# Patient Record
Sex: FEMALE | Race: WHITE | NOT HISPANIC OR LATINO | Employment: OTHER | URBAN - METROPOLITAN AREA
[De-identification: names, ages, dates, MRNs, and addresses within clinical notes are randomized per-mention and may not be internally consistent; named-entity substitution may affect disease eponyms.]

---

## 2017-04-24 ENCOUNTER — OFFICE VISIT (OUTPATIENT)
Dept: NEUROSURGERY | Facility: CLINIC | Age: 72
End: 2017-04-24

## 2017-04-24 ENCOUNTER — HOSPITAL ENCOUNTER (OUTPATIENT)
Dept: GENERAL RADIOLOGY | Facility: HOSPITAL | Age: 72
Discharge: HOME | End: 2017-04-24
Attending: NEUROLOGICAL SURGERY

## 2017-04-24 ENCOUNTER — HOSPITAL ENCOUNTER (OUTPATIENT)
Dept: MRI IMAGING | Facility: HOSPITAL | Age: 72
Discharge: HOME | End: 2017-04-24
Attending: NEUROLOGICAL SURGERY | Admitting: NEUROLOGICAL SURGERY

## 2017-04-24 VITALS
DIASTOLIC BLOOD PRESSURE: 78 MMHG | WEIGHT: 111 LBS | TEMPERATURE: 98.2 F | HEIGHT: 62 IN | SYSTOLIC BLOOD PRESSURE: 134 MMHG | BODY MASS INDEX: 20.43 KG/M2

## 2017-04-24 DIAGNOSIS — M47.12 CERVICAL SPONDYLOSIS WITH MYELOPATHY: ICD-10-CM

## 2017-04-24 DIAGNOSIS — M48.02 SPINAL STENOSIS IN CERVICAL REGION: ICD-10-CM

## 2017-04-24 DIAGNOSIS — M54.2 NECK PAIN: ICD-10-CM

## 2017-04-24 DIAGNOSIS — D32.9 MENINGIOMA (HCC): Primary | ICD-10-CM

## 2017-04-24 PROCEDURE — 70553 MRI BRAIN STEM W/O & W/DYE: CPT

## 2017-04-24 PROCEDURE — 0 GADOBENATE DIMEGLUMINE 529 MG/ML SOLUTION: Performed by: NEUROLOGICAL SURGERY

## 2017-04-24 PROCEDURE — 99213 OFFICE O/P EST LOW 20 MIN: CPT | Performed by: NEUROLOGICAL SURGERY

## 2017-04-24 PROCEDURE — A9577 INJ MULTIHANCE: HCPCS | Performed by: NEUROLOGICAL SURGERY

## 2017-04-24 PROCEDURE — 72052 X-RAY EXAM NECK SPINE 6/>VWS: CPT

## 2017-04-24 PROCEDURE — 82565 ASSAY OF CREATININE: CPT

## 2017-04-24 RX ADMIN — GADOBENATE DIMEGLUMINE 9 ML: 529 INJECTION, SOLUTION INTRAVENOUS at 09:59

## 2017-04-24 NOTE — PROGRESS NOTES
Bianka Chan  1945  [id number]                       CURRENT WORKING DIAGNOSIS:  [Cervical myelopathy; left cerebellar meningioma ]         MEDICAL HISTORY SINCE LAST ENCOUNTER:  [This a patient who had a upper cervical decompression several years ago and now presents with what sounds like RSD in her left upper extremity.  I have been following her for an asymptomatic cerebellar meningioma is had no symptoms from that.  She reports today for a reevaluation. ]           Past Medical History:   Diagnosis Date   • Anemia    • Arthritis    • Asthma    • History of blood transfusion    • Hypertension    • Osteoporosis               Past Surgical History:   Procedure Laterality Date   • CERVICAL LAMINECTOMY DECOMPRESSION POSTERIOR  04/27/2016    C2-C3, C3-C4,C4-C5 and C5-C6            History reviewed. No pertinent family history.           Social History     Social History   • Marital status:      Spouse name: N/A   • Number of children: N/A   • Years of education: N/A     Occupational History   • Not on file.     Social History Main Topics   • Smoking status: Never Smoker   • Smokeless tobacco: Not on file   • Alcohol use No   • Drug use: No   • Sexual activity: Defer     Other Topics Concern   • Not on file     Social History Narrative              Allergies   Allergen Reactions   • Inderal [Propranolol]    • Penicillins    • Phenergan [Promethazine]               Current Outpatient Prescriptions:   •  albuterol (PROVENTIL HFA;VENTOLIN HFA) 108 (90 BASE) MCG/ACT inhaler, Inhale 2 puffs as needed for wheezing., Disp: , Rfl:   •  calcium carbonate (OS-SUSAN) 600 MG tablet, Take 600 mg by mouth daily., Disp: , Rfl:   •  Cyanocobalamin 1000 MCG/ML kit, Inject  as directed., Disp: , Rfl:   •  Denosumab (PROLIA SC), Inject  under the skin every 6 (six) months., Disp: , Rfl:   •  DICLOFENAC SODIUM PO, Take 75 mg by mouth daily., Disp: , Rfl:   •  enalapril (VASOTEC) 5 MG tablet, Take 5 mg by mouth daily., Disp:  , Rfl:   •  escitalopram (LEXAPRO) 20 MG tablet, Take 20 mg by mouth daily., Disp: , Rfl:   •  fluticasone (FLONASE) 50 MCG/ACT nasal spray, 2 sprays into each nostril as needed for rhinitis. Administer 2 sprays in each nostril for each dose., Disp: , Rfl:   •  HYDROcodone-acetaminophen (NORCO)  MG per tablet, Take 1 tablet by mouth every 6 (six) hours as needed for moderate pain (4-6) (For pain)., Disp: 120 tablet, Rfl: 0  •  orphenadrine (NORFLEX) 100 MG 12 hr tablet, Take 100 mg by mouth 2 (Two) Times a Day., Disp: , Rfl:   •  Prenatal Vit-Fe Fumarate-FA (PRENATAL, CLASSIC, VITAMIN) 28-0.8 MG tablet tablet, Take 1 tablet by mouth daily., Disp: , Rfl:   •  solifenacin (VESICARE) 10 MG tablet, Take 10 mg by mouth daily., Disp: , Rfl:   No current facility-administered medications for this visit.          Review of Systems   Constitutional: Negative for activity change, appetite change, chills, diaphoresis, fatigue, fever and unexpected weight change.   HENT: Negative for congestion, dental problem, drooling, ear discharge, ear pain, facial swelling, hearing loss, mouth sores, nosebleeds, postnasal drip, rhinorrhea, sinus pressure, sneezing, sore throat, tinnitus, trouble swallowing and voice change.    Eyes: Negative for photophobia, pain, discharge, redness, itching and visual disturbance.   Respiratory: Negative for apnea, cough, choking, chest tightness, shortness of breath, wheezing and stridor.    Cardiovascular: Negative for chest pain, palpitations and leg swelling.   Gastrointestinal: Negative for abdominal distention, abdominal pain, anal bleeding, blood in stool, constipation, diarrhea, nausea, rectal pain and vomiting.   Endocrine: Negative for cold intolerance, heat intolerance, polydipsia, polyphagia and polyuria.   Genitourinary: Negative for decreased urine volume, difficulty urinating, dysuria, enuresis, flank pain, frequency, genital sores, hematuria and urgency.   Musculoskeletal: Negative  "for arthralgias, back pain, gait problem, joint swelling, myalgias, neck pain and neck stiffness.   Skin: Negative for color change, pallor, rash and wound.   Allergic/Immunologic: Negative for environmental allergies, food allergies and immunocompromised state.   Neurological: Negative for dizziness, tremors, seizures, syncope, facial asymmetry, speech difficulty, weakness, light-headedness, numbness and headaches.   Hematological: Negative for adenopathy. Does not bruise/bleed easily.   Psychiatric/Behavioral: Negative for agitation, behavioral problems, confusion, decreased concentration, dysphoric mood, hallucinations, self-injury, sleep disturbance and suicidal ideas. The patient is not nervous/anxious and is not hyperactive.    All other systems reviewed and are negative.              Vitals:    04/24/17 1237   BP: 134/78   Temp: 98.2 °F (36.8 °C)   Weight: 111 lb (50.3 kg)   Height: 62.25\" (158.1 cm)               EXAMINATION: Cerebellar testing is normal.  There is no ataxia or dysmetria.  She does have a myelopathy and decreased range of motion in both upper extremities and lower extremities.  She also has cervical fusion.  There is no papilledema however.  No            MEDICAL DECISION MAKING: The MRI of the brain is essentially unchanged from that done to and a half years ago.  She has a small asymptomatic meningioma in the left hemisphere measuring less than 2 cm.           ASSESSMENT/DISPOSITION: She will return to see me in 1 year for follow-up of the MRI of the brain.  She is to see Dr. Dey for follow-up in a few weeks.  I've asked her to see Dr. dumont with a question regarding whether or not a stellate ganglion block would be of some benefit to her in reference to RSD of her left upper extremity.              I APPRECIATE THE OPPORTUNITY OF THIS REFERRAL. PLEASE CALL IF ANY  QUESTIONS [phone]    Scribed for Edis Romeo MD by Karen Johns CMA. 4/24/2017  1:21 PM     I have read and " concur with the information provided by the scribe.  Edis Romeo MD

## 2017-04-26 LAB — CREAT BLDA-MCNC: 0.8 MG/DL (ref 0.6–1.3)

## 2017-04-27 ENCOUNTER — TELEPHONE (OUTPATIENT)
Dept: PAIN MEDICINE | Facility: CLINIC | Age: 72
End: 2017-04-27

## 2017-04-27 RX ORDER — RANITIDINE 150 MG/1
TABLET ORAL
COMMUNITY
Start: 2017-02-22 | End: 2018-08-30

## 2017-04-27 RX ORDER — ATORVASTATIN CALCIUM 20 MG/1
TABLET, FILM COATED ORAL
COMMUNITY
Start: 2017-04-23

## 2017-05-02 ENCOUNTER — OFFICE VISIT (OUTPATIENT)
Dept: PAIN MEDICINE | Facility: CLINIC | Age: 72
End: 2017-05-02

## 2017-05-02 VITALS
RESPIRATION RATE: 16 BRPM | WEIGHT: 110 LBS | HEART RATE: 59 BPM | HEIGHT: 62 IN | BODY MASS INDEX: 20.24 KG/M2 | TEMPERATURE: 96.8 F | OXYGEN SATURATION: 98 % | DIASTOLIC BLOOD PRESSURE: 73 MMHG | SYSTOLIC BLOOD PRESSURE: 151 MMHG

## 2017-05-02 DIAGNOSIS — G89.0 CENTRAL PAIN SYNDROME: ICD-10-CM

## 2017-05-02 DIAGNOSIS — Z98.1 HISTORY OF FUSION OF CERVICAL SPINE: ICD-10-CM

## 2017-05-02 DIAGNOSIS — R53.81 PHYSICAL DECONDITIONING: ICD-10-CM

## 2017-05-02 DIAGNOSIS — G95.9 CERVICAL MYELOPATHY (HCC): ICD-10-CM

## 2017-05-02 DIAGNOSIS — Z91.81 AT HIGH RISK FOR FALLS: ICD-10-CM

## 2017-05-02 DIAGNOSIS — D32.9 MENINGIOMA (HCC): ICD-10-CM

## 2017-05-02 DIAGNOSIS — G90.512 COMPLEX REGIONAL PAIN SYNDROME TYPE 1 OF LEFT UPPER EXTREMITY: ICD-10-CM

## 2017-05-02 DIAGNOSIS — R26.9 GAIT DISTURBANCE: ICD-10-CM

## 2017-05-02 PROCEDURE — 99204 OFFICE O/P NEW MOD 45 MIN: CPT | Performed by: ANESTHESIOLOGY

## 2017-05-08 ENCOUNTER — OUTSIDE FACILITY SERVICE (OUTPATIENT)
Dept: PAIN MEDICINE | Facility: CLINIC | Age: 72
End: 2017-05-08

## 2017-05-08 PROCEDURE — 77003 FLUOROGUIDE FOR SPINE INJECT: CPT | Performed by: ANESTHESIOLOGY

## 2017-05-08 PROCEDURE — 64510 N BLOCK STELLATE GANGLION: CPT | Performed by: ANESTHESIOLOGY

## 2017-05-08 PROCEDURE — 99152 MOD SED SAME PHYS/QHP 5/>YRS: CPT | Performed by: ANESTHESIOLOGY

## 2017-05-15 ENCOUNTER — OFFICE VISIT (OUTPATIENT)
Dept: NEUROSURGERY | Facility: CLINIC | Age: 72
End: 2017-05-15

## 2017-05-15 VITALS
HEIGHT: 62 IN | BODY MASS INDEX: 20.43 KG/M2 | DIASTOLIC BLOOD PRESSURE: 82 MMHG | TEMPERATURE: 98.5 F | WEIGHT: 111 LBS | OXYGEN SATURATION: 96 % | SYSTOLIC BLOOD PRESSURE: 120 MMHG | HEART RATE: 61 BPM

## 2017-05-15 DIAGNOSIS — M48.02 SPINAL STENOSIS IN CERVICAL REGION: ICD-10-CM

## 2017-05-15 DIAGNOSIS — M47.12 CERVICAL SPONDYLOSIS WITH MYELOPATHY: ICD-10-CM

## 2017-05-15 DIAGNOSIS — D32.9 MENINGIOMA (HCC): Primary | ICD-10-CM

## 2017-05-15 DIAGNOSIS — M54.2 NECK PAIN: ICD-10-CM

## 2017-05-15 PROCEDURE — 99213 OFFICE O/P EST LOW 20 MIN: CPT | Performed by: NEUROLOGICAL SURGERY

## 2017-05-15 RX ORDER — ACETAMINOPHEN AND CODEINE PHOSPHATE 300; 30 MG/1; MG/1
1 TABLET ORAL AS NEEDED
COMMUNITY
End: 2019-12-30

## 2017-05-17 ENCOUNTER — OUTSIDE FACILITY SERVICE (OUTPATIENT)
Dept: PAIN MEDICINE | Facility: CLINIC | Age: 72
End: 2017-05-17

## 2017-05-17 PROCEDURE — 77003 FLUOROGUIDE FOR SPINE INJECT: CPT | Performed by: ANESTHESIOLOGY

## 2017-05-17 PROCEDURE — 99152 MOD SED SAME PHYS/QHP 5/>YRS: CPT | Performed by: ANESTHESIOLOGY

## 2017-05-17 PROCEDURE — 64510 N BLOCK STELLATE GANGLION: CPT | Performed by: ANESTHESIOLOGY

## 2017-06-07 ENCOUNTER — OUTSIDE FACILITY SERVICE (OUTPATIENT)
Dept: PAIN MEDICINE | Facility: CLINIC | Age: 72
End: 2017-06-07

## 2017-06-07 PROCEDURE — 64510 N BLOCK STELLATE GANGLION: CPT | Performed by: ANESTHESIOLOGY

## 2017-06-07 PROCEDURE — 99152 MOD SED SAME PHYS/QHP 5/>YRS: CPT | Performed by: ANESTHESIOLOGY

## 2017-06-07 PROCEDURE — 77003 FLUOROGUIDE FOR SPINE INJECT: CPT | Performed by: ANESTHESIOLOGY

## 2017-06-19 ENCOUNTER — OUTSIDE FACILITY SERVICE (OUTPATIENT)
Dept: PAIN MEDICINE | Facility: CLINIC | Age: 72
End: 2017-06-19

## 2017-06-19 PROCEDURE — 99152 MOD SED SAME PHYS/QHP 5/>YRS: CPT | Performed by: ANESTHESIOLOGY

## 2017-06-19 PROCEDURE — 64510 N BLOCK STELLATE GANGLION: CPT | Performed by: ANESTHESIOLOGY

## 2017-06-19 PROCEDURE — 77003 FLUOROGUIDE FOR SPINE INJECT: CPT | Performed by: ANESTHESIOLOGY

## 2017-07-12 ENCOUNTER — OUTSIDE FACILITY SERVICE (OUTPATIENT)
Dept: PAIN MEDICINE | Facility: CLINIC | Age: 72
End: 2017-07-12

## 2017-07-12 PROCEDURE — 77003 FLUOROGUIDE FOR SPINE INJECT: CPT | Performed by: ANESTHESIOLOGY

## 2017-07-12 PROCEDURE — 99152 MOD SED SAME PHYS/QHP 5/>YRS: CPT | Performed by: ANESTHESIOLOGY

## 2017-07-12 PROCEDURE — 64510 N BLOCK STELLATE GANGLION: CPT | Performed by: ANESTHESIOLOGY

## 2017-07-13 ENCOUNTER — TELEPHONE (OUTPATIENT)
Dept: PAIN MEDICINE | Facility: CLINIC | Age: 72
End: 2017-07-13

## 2017-07-13 NOTE — TELEPHONE ENCOUNTER
SPOKE WITH PT TO F/U ON GANGLION BLOCKS, SHE STATED THAT SHE WAS DOING OK AND DID USE ICE. INFORMED PT TO CALL IF ANY QUESTIONS OR CONCERNS.

## 2018-04-24 ENCOUNTER — TELEPHONE (OUTPATIENT)
Dept: NEUROSURGERY | Facility: CLINIC | Age: 73
End: 2018-04-24

## 2018-04-24 DIAGNOSIS — D49.6 BRAIN TUMOR (HCC): Primary | ICD-10-CM

## 2018-05-24 ENCOUNTER — OFFICE VISIT (OUTPATIENT)
Dept: NEUROSURGERY | Facility: CLINIC | Age: 73
End: 2018-05-24

## 2018-05-24 ENCOUNTER — HOSPITAL ENCOUNTER (OUTPATIENT)
Dept: MRI IMAGING | Facility: HOSPITAL | Age: 73
Discharge: HOME | End: 2018-05-24
Attending: NEUROLOGICAL SURGERY | Admitting: NEUROLOGICAL SURGERY

## 2018-05-24 ENCOUNTER — TELEPHONE (OUTPATIENT)
Dept: NEUROSURGERY | Facility: CLINIC | Age: 73
End: 2018-05-24

## 2018-05-24 VITALS
SYSTOLIC BLOOD PRESSURE: 128 MMHG | DIASTOLIC BLOOD PRESSURE: 60 MMHG | HEIGHT: 62 IN | TEMPERATURE: 98.3 F | BODY MASS INDEX: 23 KG/M2 | WEIGHT: 125 LBS

## 2018-05-24 DIAGNOSIS — M79.601 PAIN IN BOTH UPPER EXTREMITIES: ICD-10-CM

## 2018-05-24 DIAGNOSIS — M79.602 PAIN IN BOTH UPPER EXTREMITIES: ICD-10-CM

## 2018-05-24 DIAGNOSIS — D32.9 MENINGIOMA (HCC): Primary | ICD-10-CM

## 2018-05-24 DIAGNOSIS — D49.6 BRAIN TUMOR (HCC): ICD-10-CM

## 2018-05-24 PROCEDURE — 82565 ASSAY OF CREATININE: CPT

## 2018-05-24 PROCEDURE — 70553 MRI BRAIN STEM W/O & W/DYE: CPT

## 2018-05-24 PROCEDURE — 99213 OFFICE O/P EST LOW 20 MIN: CPT | Performed by: NEUROLOGICAL SURGERY

## 2018-05-24 PROCEDURE — 0 GADOBENATE DIMEGLUMINE 529 MG/ML SOLUTION: Performed by: NEUROLOGICAL SURGERY

## 2018-05-24 PROCEDURE — A9577 INJ MULTIHANCE: HCPCS | Performed by: NEUROLOGICAL SURGERY

## 2018-05-24 RX ORDER — CARBAMAZEPINE 100 MG/1
100 TABLET, EXTENDED RELEASE ORAL
Qty: 15 TABLET | Refills: 0 | Status: SHIPPED | OUTPATIENT
Start: 2018-05-24 | End: 2018-08-30

## 2018-05-24 RX ORDER — AMLODIPINE BESYLATE 5 MG/1
5 TABLET ORAL DAILY
COMMUNITY
End: 2018-08-30

## 2018-05-24 RX ADMIN — GADOBENATE DIMEGLUMINE 10 ML: 529 INJECTION, SOLUTION INTRAVENOUS at 12:15

## 2018-05-24 NOTE — TELEPHONE ENCOUNTER
PT STATES THAT DR ATKINS TOLD HER SHE WOULD NEED MRI BEFORE SHE SEE'S DR REED FOR ARM/SHOULDER PAIN. NO ORDERS WERE PLACED AND I DIDN'T SEE IT IN THE OFFICE NOTES.  CAN YOU PLEASE CLARIFY.

## 2018-05-24 NOTE — PROGRESS NOTES
Bianka Chan  1945  [id number]                       CURRENT WORKING DIAGNOSIS:  [Asymptomatic right cerebellar meningioma; David reflex sympathetic dystrophy left upper extremity ]         MEDICAL HISTORY SINCE LAST ENCOUNTER:  This 73-year-old female reports today for follow-up and sequential MRI investigation of an asymptomatic meningioma in the right posterior fossa.  She remains asymptomatic in reference to cerebellar dysfunction.  However she is having increasing pain in her left arm consistent with the clinical diagnosis of RSD.  Stellate ganglion blocks have been unsuccessful.           Past Medical History:   Diagnosis Date   • Anemia    • Arthritis    • Asthma    • Extremity pain    • History of blood transfusion    • Hypertension    • Osteoarthritis    • Osteoporosis               Past Surgical History:   Procedure Laterality Date   • CERVICAL LAMINECTOMY DECOMPRESSION POSTERIOR  04/27/2016    C2-C3, C3-C4,C4-C5 and C5-C6   • HYSTERECTOMY     • TONSILLECTOMY                Family History   Problem Relation Age of Onset   • No Known Problems Mother    • No Known Problems Father               Social History     Social History   • Marital status:      Spouse name: N/A   • Number of children: N/A   • Years of education: N/A     Occupational History   • Not on file.     Social History Main Topics   • Smoking status: Never Smoker   • Smokeless tobacco: Never Used   • Alcohol use No   • Drug use: No   • Sexual activity: Defer     Other Topics Concern   • Not on file     Social History Narrative   • No narrative on file              Allergies   Allergen Reactions   • Inderal [Propranolol]    • Penicillins    • Phenergan [Promethazine]               Current Outpatient Prescriptions:   •  acetaminophen-codeine (TYLENOL #3) 300-30 MG per tablet, Take 1 tablet by mouth As Needed for Moderate Pain (4-6)., Disp: , Rfl:   •  albuterol (PROVENTIL HFA;VENTOLIN HFA) 108 (90 BASE) MCG/ACT inhaler, Inhale 2  puffs as needed for wheezing., Disp: , Rfl:   •  amLODIPine (NORVASC) 5 MG tablet, Take 5 mg by mouth Daily., Disp: , Rfl:   •  atorvastatin (LIPITOR) 20 MG tablet, , Disp: , Rfl:   •  calcium carbonate (OS-SUSAN) 600 MG tablet, Take 600 mg by mouth daily., Disp: , Rfl:   •  Cyanocobalamin 1000 MCG/ML kit, Inject  as directed., Disp: , Rfl:   •  Denosumab (PROLIA SC), Inject  under the skin every 6 (six) months., Disp: , Rfl:   •  DICLOFENAC SODIUM PO, Take 75 mg by mouth daily., Disp: , Rfl:   •  enalapril (VASOTEC) 5 MG tablet, Take 5 mg by mouth daily., Disp: , Rfl:   •  escitalopram (LEXAPRO) 20 MG tablet, Take 20 mg by mouth daily., Disp: , Rfl:   •  fluticasone (FLONASE) 50 MCG/ACT nasal spray, 2 sprays into each nostril as needed for rhinitis. Administer 2 sprays in each nostril for each dose., Disp: , Rfl:   •  orphenadrine (NORFLEX) 100 MG 12 hr tablet, Take 100 mg by mouth 2 (Two) Times a Day., Disp: , Rfl:   •  Prenatal Vit-Fe Fumarate-FA (PRENATAL, CLASSIC, VITAMIN) 28-0.8 MG tablet tablet, Take 1 tablet by mouth daily., Disp: , Rfl:   •  raNITIdine (ZANTAC) 150 MG tablet, , Disp: , Rfl:   •  solifenacin (VESICARE) 10 MG tablet, Take 10 mg by mouth daily., Disp: , Rfl:   No current facility-administered medications for this visit.          Review of Systems   Constitutional: Negative for activity change, appetite change, chills, diaphoresis, fatigue, fever and unexpected weight change.   HENT: Positive for hearing loss. Negative for congestion, dental problem, drooling, ear discharge, ear pain, facial swelling, mouth sores, nosebleeds, postnasal drip, rhinorrhea, sinus pressure, sneezing, sore throat, tinnitus, trouble swallowing and voice change.    Eyes: Negative for photophobia, pain, discharge, redness, itching and visual disturbance.   Respiratory: Negative for apnea, cough, choking, chest tightness, shortness of breath, wheezing and stridor.    Cardiovascular: Negative for chest pain, palpitations  "and leg swelling.   Gastrointestinal: Negative for abdominal distention, abdominal pain, anal bleeding, blood in stool, constipation, diarrhea, nausea, rectal pain and vomiting.   Endocrine: Negative for cold intolerance, heat intolerance, polydipsia, polyphagia and polyuria.   Genitourinary: Positive for frequency and urgency. Negative for decreased urine volume, difficulty urinating, dysuria, enuresis, flank pain, genital sores and hematuria.   Musculoskeletal: Positive for arthralgias and gait problem. Negative for back pain, joint swelling, myalgias, neck pain and neck stiffness.   Skin: Negative for color change, pallor, rash and wound.   Allergic/Immunologic: Negative for environmental allergies, food allergies and immunocompromised state.   Neurological: Positive for numbness. Negative for dizziness, tremors, seizures, syncope, facial asymmetry, speech difficulty, weakness, light-headedness and headaches.   Hematological: Negative for adenopathy. Does not bruise/bleed easily.   Psychiatric/Behavioral: Positive for dysphoric mood. Negative for agitation, behavioral problems, confusion, decreased concentration, hallucinations, self-injury, sleep disturbance and suicidal ideas. The patient is nervous/anxious. The patient is not hyperactive.                Vitals:    05/24/18 1353   BP: 128/60   BP Location: Right arm   Patient Position: Sitting   Temp: 98.3 °F (36.8 °C)   TempSrc: Temporal Artery    Weight: 56.7 kg (125 lb)   Height: 158 cm (62.21\")               EXAMINATION: She has diminished range of motion of her neck secondary to her previous fusion.  Examination otherwise is unchanged.  No ataxia or dysfunction of gait different from her previous examinations.            MEDICAL DECISION MAKING: The data set of MRI I from the brain indicates that the tumor is slightly bigger by approximately 2-3 millimeters.  There is no secondary change on the T2 images in the cerebellum therefore it remains quiet sent " although it has enlarged.  There is no shift.           ASSESSMENT/DISPOSITION: I've suggested repeating her MRI in 6 months.  If it shows progression I would recommend SRS.  In the interim I've given her prescription of Tegretol 100 mg to see if this will be of any benefit to the RSD.  She will call me in 2 weeks.              I APPRECIATE THE OPPORTUNITY OF THIS REFERRAL. PLEASE CALL IF ANY       QUESTIONS [phone]    Scribed for Edis Romeo MD by Janey Cerrato CMA. 5/24/2018  2:12 PM     I have read and concur with the information provided by the scribe.  Edis Romeo MD

## 2018-05-29 LAB — CREAT BLDA-MCNC: 0.8 MG/DL (ref 0.6–1.3)

## 2018-06-08 ENCOUNTER — TELEPHONE (OUTPATIENT)
Dept: NEUROSURGERY | Facility: CLINIC | Age: 73
End: 2018-06-08

## 2018-06-08 NOTE — TELEPHONE ENCOUNTER
----- Message from Makenzie Walsh sent at 6/8/2018 12:27 PM EDT -----  Contact: [phone]  PATIENT CALLING TO SAY THAT THE TEGRETOL 100 ONE AT NIGHT IS NOT HELPING HER.

## 2018-06-12 RX ORDER — PREGABALIN 75 MG/1
75 CAPSULE ORAL 2 TIMES DAILY
Qty: 28 CAPSULE | Refills: 0 | OUTPATIENT
Start: 2018-06-12 | End: 2018-07-13

## 2018-06-12 NOTE — TELEPHONE ENCOUNTER
Called pt to inform her of lyrica rx and free trial card.  Both called into the [facility] pharmacy.  Pt will call back in a week with an update on the effectiveness of this medication and if she needs a refill.  Mail copay savings card to pt for refills.

## 2018-06-25 ENCOUNTER — TELEPHONE (OUTPATIENT)
Dept: NEUROSURGERY | Facility: CLINIC | Age: 73
End: 2018-06-25

## 2018-06-25 NOTE — TELEPHONE ENCOUNTER
Provider: Ousmane  Caller: pt   Time of call: 4:30    Phone #: [phone]    Surgery: N/A  Last visit: 05/24/18     Next visit: 07/13/18 ([place])       Reason for call: Dr. Romeo asked the pt to call with an update about the Lyrica. The pt states the medicine helped with the pain. However, it made her so dizzy she couldn't stand so she has stopped taking this medication.

## 2018-06-25 NOTE — TELEPHONE ENCOUNTER
Bianka was taking 75 mg of Lyrica which helped considerably however the side effects of dizziness major stop.  I would like to try her own 25 mg at night only.    Recall this and an check with her in the morning.  The me know what's up.  Thank you

## 2018-06-26 NOTE — TELEPHONE ENCOUNTER
Talked with Dr. Romeo - He said 75mg was too high for pt and to decrease to 25mg qhs.  #15. Called rx into [facility] pharmacy.      Called pt (home #) to inform of new rx at pharmacy.  No answer.   Called mobile  - spoke with pt.  She verbalized understanding and will call with an update in about 1 week or sooner if needed.      If dosage is adequate, a refill will need to be sent in at that time.

## 2018-07-13 ENCOUNTER — OFFICE VISIT (OUTPATIENT)
Dept: NEUROSURGERY | Facility: CLINIC | Age: 73
End: 2018-07-13

## 2018-07-13 VITALS
TEMPERATURE: 98.6 F | BODY MASS INDEX: 22.38 KG/M2 | OXYGEN SATURATION: 98 % | WEIGHT: 121.6 LBS | SYSTOLIC BLOOD PRESSURE: 126 MMHG | HEIGHT: 62 IN | HEART RATE: 54 BPM | DIASTOLIC BLOOD PRESSURE: 70 MMHG

## 2018-07-13 DIAGNOSIS — M48.02 SPINAL STENOSIS IN CERVICAL REGION: ICD-10-CM

## 2018-07-13 DIAGNOSIS — M79.602 PAIN IN BOTH UPPER EXTREMITIES: ICD-10-CM

## 2018-07-13 DIAGNOSIS — M79.601 PAIN IN BOTH UPPER EXTREMITIES: ICD-10-CM

## 2018-07-13 DIAGNOSIS — M47.12 CERVICAL SPONDYLOSIS WITH MYELOPATHY: ICD-10-CM

## 2018-07-13 DIAGNOSIS — G90.512 COMPLEX REGIONAL PAIN SYNDROME TYPE 1 OF LEFT UPPER EXTREMITY: ICD-10-CM

## 2018-07-13 DIAGNOSIS — D32.9 MENINGIOMA (HCC): Primary | ICD-10-CM

## 2018-07-13 DIAGNOSIS — M54.2 NECK PAIN: ICD-10-CM

## 2018-07-13 DIAGNOSIS — D49.6 BRAIN TUMOR (HCC): ICD-10-CM

## 2018-07-13 PROCEDURE — 99214 OFFICE O/P EST MOD 30 MIN: CPT | Performed by: NEUROLOGICAL SURGERY

## 2018-07-13 NOTE — PROGRESS NOTES
Patient: Bianka Chan  :  1945  Chart #:  [id number]    Date of Service: 18    Chief Complaint:   Chief Complaint   Patient presents with   • Neck Pain       Neck Pain    Chronicity: Mrs. Chan returns today for a follow-up on her cervical pain. Episode onset: He put her on Lyrica however it was not effective. The problem occurs constantly (Patient states that she has been miserable for 2 years now.). The problem has been unchanged (Dr. Romeo told her that her brain tumor was growing.). The pain is associated with an unknown factor. The pain is present in the right side (She complains of pain in her LUE.  She is no longer having any problems with her cervical spine.). The quality of the pain is described as aching (She has no problems with her RUE.). The pain is at a severity of 5/10. The pain is moderate. Exacerbated by: Her strenth is  The pain is same all the time. Associated symptoms include headaches, numbness and weakness. She has tried bed rest, heat and NSAIDs (Patient has been to physical therapy, however her relief was short lived.) for the symptoms. The treatment provided mild relief.       Radiographic Images:   Brain MRI done 18 was reviewed showing an extra-axial tumor in L posterior fossa c/w meningioma.    No recent studies of neck.    Past Medical History:   Diagnosis Date   • Anemia    • Arthritis    • Asthma    • Extremity pain    • History of blood transfusion    • Hypertension    • Osteoarthritis    • Osteoporosis      Current Outpatient Prescriptions   Medication Sig Dispense Refill   • acetaminophen-codeine (TYLENOL #3) 300-30 MG per tablet Take 1 tablet by mouth As Needed for Moderate Pain (4-6).     • albuterol (PROVENTIL HFA;VENTOLIN HFA) 108 (90 BASE) MCG/ACT inhaler Inhale 2 puffs as needed for wheezing.     • amLODIPine (NORVASC) 5 MG tablet Take 5 mg by mouth Daily.     • atorvastatin (LIPITOR) 20 MG tablet      • calcium carbonate (OS-SUSAN) 600 MG tablet Take  600 mg by mouth daily.     • carBAMazepine XR (TEGRETOL-XR) 100 MG 12 hr tablet Take 1 tablet by mouth every night at bedtime. 15 tablet 0   • Cyanocobalamin 1000 MCG/ML kit Inject  as directed.     • Denosumab (PROLIA SC) Inject  under the skin every 6 (six) months.     • DICLOFENAC SODIUM PO Take 75 mg by mouth daily.     • enalapril (VASOTEC) 5 MG tablet Take 5 mg by mouth daily.     • escitalopram (LEXAPRO) 20 MG tablet Take 20 mg by mouth daily.     • fluticasone (FLONASE) 50 MCG/ACT nasal spray 2 sprays into each nostril as needed for rhinitis. Administer 2 sprays in each nostril for each dose.     • Prenatal Vit-Fe Fumarate-FA (PRENATAL, CLASSIC, VITAMIN) 28-0.8 MG tablet tablet Take 1 tablet by mouth daily.     • raNITIdine (ZANTAC) 150 MG tablet      • solifenacin (VESICARE) 10 MG tablet Take 10 mg by mouth daily.       No current facility-administered medications for this visit.       Allergies   Allergen Reactions   • Inderal [Propranolol]    • Penicillins    • Phenergan [Promethazine]      Social History     Social History   • Marital status:      Social History Main Topics   • Smoking status: Never Smoker   • Smokeless tobacco: Never Used   • Alcohol use No   • Drug use: No   • Sexual activity: Defer     Other Topics Concern   • Not on file     Family History   Problem Relation Age of Onset   • No Known Problems Mother    • No Known Problems Father      Past Surgical History:   Procedure Laterality Date   • CERVICAL LAMINECTOMY DECOMPRESSION POSTERIOR  04/27/2016    C2-C3, C3-C4,C4-C5 and C5-C6   • HYSTERECTOMY     • TONSILLECTOMY       Review of Systems   Constitutional: Positive for fatigue.   Eyes: Positive for visual disturbance.   Respiratory: Positive for shortness of breath.    Endocrine: Positive for polyuria.   Genitourinary: Positive for frequency and urgency.   Musculoskeletal: Positive for gait problem and neck pain.   Neurological: Positive for weakness, numbness and headaches.  "  Psychiatric/Behavioral: Positive for dysphoric mood.   All other systems reviewed and are negative.    Vitals:    07/13/18 1041   BP: 126/70   BP Location: Right arm   Patient Position: Sitting   Pulse: 54   Temp: 98.6 °F (37 °C)   TempSrc: Temporal Artery    SpO2: 98%   Weight: 55.2 kg (121 lb 9.6 oz)   Height: 158 cm (62.21\")     Physical Exam  Neurologic Exam  Physical Exam   Constitutional: The patient is oriented to person, place, and time.  The patient appears well-developed and well-nourished and in no distress.   Neat elderly female  HENT:   Head: Normocephalic.   Right Ear: Hearing normal.   Left Ear: Hearing normal.   Mouth/Throat: Uvula is midline, oropharynx is clear and moist and mucous membranes are normal.   Eyes: Conjunctivae, EOM and lids are normal. Pupils are equal, round, and reactive to light.   Fundoscopic exam:  The right eye shows no papilledema.   The left eye shows no papilledema.   Pupils 2 mm; Fundi normal   Neck: Trachea normal and normal range of motion. No thyroid mass present.  Healed anterior and posterior incisions.    Cardiovascular: Regular rhythm   Pulses:  Carotid pulses are 2+ on the right side, and 2+ on the left side.  Radial pulses are 2+ on the right side, and 2+ on the left side.   Dorsalis pedis pulses are 2+ on the right side, and 2+ on the left side.   Pulmonary/Chest: Effort normal     Extremities:  No atrophy     Neurologic Exam      Mental Status   Oriented to person, place, and time.   Attention: normal. Concentration: normal.   Speech: speech is normal   Level of consciousness: alert  Knowledge: good and consistent with education.   Normal comprehension.      Cranial Nerves   Cranial nerves II through XII intact.     Motor Exam   Muscle bulk: normal  Overall muscle tone: increased in legs;  No Pronator Drift    Strength   Strength 5/5 throughout.      Sensory Exam   Light touch normal except hypesthesia both hands and feet.  No Allodynia Left arm  Proprioception " normal.      Gait, Coordination, and Reflexes      Gait: mildly spastic and very unsteady     Tremor   Resting tremor: absent  Intention tremor: absent  Action tremor: absent     Reflexes   Right biceps: 2+  Left biceps: 2+  Right triceps: 2+  Left triceps: 2+  Right patellar: 2+  Left patellar: 2+  Right achilles: 1+  Left achilles: 1+  No Babinski signs  Right Palomares: present  Left Palomares: present  Right ankle clonus: absent  Left ankle clonus: absent  LINDSEY normal; R handed      Bianka was seen today for neck pain.    Diagnoses and all orders for this visit:    Meningioma (CMS/HCC)  -     MRI Cervical Spine Without Contrast; Future  -     XR Spine Cervical Complete 4 or 5 View; Future    Cervical spondylosis with myelopathy  -     MRI Cervical Spine Without Contrast; Future  -     XR Spine Cervical Complete 4 or 5 View; Future    Pain in both upper extremities  -     MRI Cervical Spine Without Contrast; Future  -     XR Spine Cervical Complete 4 or 5 View; Future    Neck pain  -     MRI Cervical Spine Without Contrast; Future  -     XR Spine Cervical Complete 4 or 5 View; Future    Brain tumor (CMS/HCC)  -     MRI Cervical Spine Without Contrast; Future  -     XR Spine Cervical Complete 4 or 5 View; Future    Spinal stenosis in cervical region  -     MRI Cervical Spine Without Contrast; Future  -     XR Spine Cervical Complete 4 or 5 View; Future    Complex regional pain syndrome type 1 of left upper extremity    Other orders  -     Cancel: XR Spine Cervical Complete With Obli Flex Ext; Future      Plan:  Order cervical spine x-rays and MRI scan;  Return for re-evaluation after studies.      I, Dr. Stephen Dey, personally performed the services described in the documentation as scribed in my presence, and the documentation is both accurate and complete.    Stephen Dey MD

## 2018-07-25 ENCOUNTER — HOSPITAL ENCOUNTER (OUTPATIENT)
Dept: GENERAL RADIOLOGY | Facility: HOSPITAL | Age: 73
Discharge: HOME | End: 2018-07-25
Attending: NEUROLOGICAL SURGERY

## 2018-07-25 ENCOUNTER — OFFICE VISIT (OUTPATIENT)
Dept: NEUROSURGERY | Facility: CLINIC | Age: 73
End: 2018-07-25

## 2018-07-25 ENCOUNTER — HOSPITAL ENCOUNTER (OUTPATIENT)
Dept: MRI IMAGING | Facility: HOSPITAL | Age: 73
Discharge: HOME | End: 2018-07-25
Attending: NEUROLOGICAL SURGERY | Admitting: NEUROLOGICAL SURGERY

## 2018-07-25 VITALS
HEIGHT: 62 IN | WEIGHT: 121 LBS | DIASTOLIC BLOOD PRESSURE: 80 MMHG | TEMPERATURE: 96.3 F | SYSTOLIC BLOOD PRESSURE: 130 MMHG | BODY MASS INDEX: 22.26 KG/M2

## 2018-07-25 DIAGNOSIS — D32.9 MENINGIOMA (HCC): Primary | ICD-10-CM

## 2018-07-25 DIAGNOSIS — M79.601 PAIN IN BOTH UPPER EXTREMITIES: ICD-10-CM

## 2018-07-25 DIAGNOSIS — M47.12 CERVICAL SPONDYLOSIS WITH MYELOPATHY: ICD-10-CM

## 2018-07-25 DIAGNOSIS — D49.6 BRAIN TUMOR (HCC): ICD-10-CM

## 2018-07-25 DIAGNOSIS — D32.9 MENINGIOMA (HCC): ICD-10-CM

## 2018-07-25 DIAGNOSIS — M79.602 PAIN IN BOTH UPPER EXTREMITIES: ICD-10-CM

## 2018-07-25 DIAGNOSIS — G90.512 COMPLEX REGIONAL PAIN SYNDROME TYPE 1 OF LEFT UPPER EXTREMITY: ICD-10-CM

## 2018-07-25 DIAGNOSIS — M48.02 SPINAL STENOSIS IN CERVICAL REGION: ICD-10-CM

## 2018-07-25 DIAGNOSIS — M54.2 NECK PAIN: ICD-10-CM

## 2018-07-25 PROCEDURE — 72141 MRI NECK SPINE W/O DYE: CPT

## 2018-07-25 PROCEDURE — 72050 X-RAY EXAM NECK SPINE 4/5VWS: CPT

## 2018-07-25 PROCEDURE — 99213 OFFICE O/P EST LOW 20 MIN: CPT | Performed by: NEUROLOGICAL SURGERY

## 2018-07-25 NOTE — PROGRESS NOTES
Patient: Bianka Chan  :  1945  Chart #:  [id number]    Date of Service: 18    Chief Complaint:   Chief Complaint   Patient presents with   • Neck Pain       Neck Pain    Chronicity: Mrs. Chan returns today for a follow-up on her cervical pain.  The problem occurs daily. The problem has been unchanged. The pain is associated with an unknown factor. The pain is present in the occipital region (She complains of a burning and a itching sensation in the back of her neck.). The quality of the pain is described as aching. The pain is at a severity of 4/10 (She has had trouble with her LUE for 2 years now.  She states she has no feeling in her left hand.  She complains of numbness and tingling in that hand.). The pain is mild. The symptoms are aggravated by position (She has pain with range of motion.). Stiffness is present in the morning. Associated symptoms include numbness. She has tried bed rest, heat and NSAIDs for the symptoms. The treatment provided mild relief.       Radiographic Images:   MRI of the cervical spine dated 18 shows a s/p ACDF at C3C4 and C5C6.  She has a mild kyphotic deformity.  There is some spinal stenosis at C5-C6 without spinal cord compression.  She has spinal cord signal change opposite the C3-C4 disc space.    X-rays of the cervical spine dated 18 shows anterior discectomy at C3-C4 and C4-C5.  She has posterior screws in the C2, C4 and C5 levels.  She has had a occipitocervical fusion.        Past Medical History:   Diagnosis Date   • Anemia    • Arthritis    • Asthma    • Extremity pain    • History of blood transfusion    • Hypertension    • Osteoarthritis    • Osteoporosis      Current Outpatient Prescriptions   Medication Sig Dispense Refill   • acetaminophen-codeine (TYLENOL #3) 300-30 MG per tablet Take 1 tablet by mouth As Needed for Moderate Pain (4-6).     • albuterol (PROVENTIL HFA;VENTOLIN HFA) 108 (90 BASE) MCG/ACT inhaler Inhale 2 puffs as needed  for wheezing.     • amLODIPine (NORVASC) 5 MG tablet Take 5 mg by mouth Daily.     • atorvastatin (LIPITOR) 20 MG tablet      • calcium carbonate (OS-SUSAN) 600 MG tablet Take 600 mg by mouth daily.     • carBAMazepine XR (TEGRETOL-XR) 100 MG 12 hr tablet Take 1 tablet by mouth every night at bedtime. 15 tablet 0   • Cyanocobalamin 1000 MCG/ML kit Inject  as directed.     • Denosumab (PROLIA SC) Inject  under the skin every 6 (six) months.     • DICLOFENAC SODIUM PO Take 75 mg by mouth daily.     • enalapril (VASOTEC) 5 MG tablet Take 5 mg by mouth daily.     • escitalopram (LEXAPRO) 20 MG tablet Take 20 mg by mouth daily.     • fluticasone (FLONASE) 50 MCG/ACT nasal spray 2 sprays into each nostril as needed for rhinitis. Administer 2 sprays in each nostril for each dose.     • Prenatal Vit-Fe Fumarate-FA (PRENATAL, CLASSIC, VITAMIN) 28-0.8 MG tablet tablet Take 1 tablet by mouth daily.     • raNITIdine (ZANTAC) 150 MG tablet      • solifenacin (VESICARE) 10 MG tablet Take 10 mg by mouth daily.       No current facility-administered medications for this visit.       Allergies   Allergen Reactions   • Inderal [Propranolol]    • Penicillins    • Phenergan [Promethazine]      Social History     Social History   • Marital status:      Social History Main Topics   • Smoking status: Never Smoker   • Smokeless tobacco: Never Used   • Alcohol use No   • Drug use: No   • Sexual activity: Defer     Other Topics Concern   • Not on file     Family History   Problem Relation Age of Onset   • No Known Problems Mother    • No Known Problems Father      Past Surgical History:   Procedure Laterality Date   • CERVICAL LAMINECTOMY DECOMPRESSION POSTERIOR  04/27/2016    C2-C3, C3-C4,C4-C5 and C5-C6   • HYSTERECTOMY     • TONSILLECTOMY       Review of Systems   Constitutional: Positive for fatigue.   HENT: Positive for rhinorrhea.    Endocrine: Positive for polyuria.   Genitourinary: Positive for urgency.   Musculoskeletal:  "Positive for gait problem and neck pain.   Neurological: Positive for numbness.   Hematological: Bruises/bleeds easily.   Psychiatric/Behavioral: Positive for dysphoric mood.   All other systems reviewed and are negative.    Vitals:    07/25/18 1412   BP: 130/80   Temp: 96.3 °F (35.7 °C)   Weight: 54.9 kg (121 lb)   Height: 158 cm (62.21\")     Physical Exam  Neurologic Exam  Constitutional: The patient is oriented to person, place, and time.  The patient appears well-developed and well-nourished and in no distress.   Neat elderly female  HENT:   Head: Normocephalic.   Right Ear: Hearing normal.   Left Ear: Hearing normal.   Mouth/Throat: Uvula is midline, oropharynx is clear and moist and mucous membranes are normal.   Eyes: Conjunctivae, EOM and lids are normal. Pupils are equal, round, and reactive to light.   Pupils 2 mm  Neck: Trachea normal and diminished range of motion. No thyroid mass present.  Healed anterior and posterior incisions.    Cardiovascular: Regular rhythm   Pulses:  Carotid pulses are 2+ on the right side, and 2+ on the left side.  Radial pulses are 2+ on the right side, and 2+ on the left side.   Dorsalis pedis pulses are 2+ on the right side, and 2+ on the left side.   Pulmonary/Chest: Effort normal     Extremities:  No atrophy      Neurologic Exam      Mental Status   Oriented to person, place, and time.   Attention: normal. Concentration: normal.   Speech: speech is normal   Level of consciousness: alert  Knowledge: good and consistent with education.   Normal comprehension.      Cranial Nerves   Cranial nerves II through XII intact.      Motor Exam   Muscle bulk: normal  Overall muscle tone: increased in legs;  No Pronator Drift    Strength   Strength 5/5 throughout.      Sensory Exam   Light touch normal except hypesthesia both hands and feet.  No Allodynia Left arm  Proprioception normal.      Gait, Coordination, and Reflexes      Gait: mildly spastic and very unsteady     Tremor "   Resting tremor: absent  Intention tremor: absent  Action tremor: absent     Reflexes   Right biceps: 2+  Left biceps: 2+  Right triceps: 2+  Left triceps: 2+  Right patellar: 2+  Left patellar: 2+  Right achilles: 1+  Left achilles: 1+  No Babinski signs  Right Palomares: present  Left Palomares: present  Right ankle clonus: absent  Left ankle clonus: absent  LINDSEY normal; R handed      Bianka was seen today for neck pain.    Diagnoses and all orders for this visit:    Meningioma (CMS/[facility])  -     Ambulatory Referral to Pain Management    Brain tumor (CMS/[facility])  -     Ambulatory Referral to Pain Management    Cervical spondylosis with myelopathy  -     Ambulatory Referral to Pain Management    Spinal stenosis in cervical region  -     Ambulatory Referral to Pain Management    Pain in both upper extremities  -     Ambulatory Referral to Pain Management    Complex regional pain syndrome type 1 of left upper extremity  -     Ambulatory Referral to Pain Management    Neck pain  -     Ambulatory Referral to Pain Management      Plan:  I do not recommend any cervical spine surgery at this time.  I have recommended seeing Dr. Brown again.  She is to return prn if new symptoms arise.  I have discussed this with the patient.      I, Dr. Stephen Dey, personally performed the services described in the documentation as scribed in my presence, and the documentation is both accurate and complete.    Stephen Dey MD

## 2018-08-06 ENCOUNTER — TELEPHONE (OUTPATIENT)
Dept: PAIN MEDICINE | Facility: CLINIC | Age: 73
End: 2018-08-06

## 2018-08-06 NOTE — TELEPHONE ENCOUNTER
[facility] Report #[id number]  MRI Cervical Spine w/o Contrast on 07/25/2018: Sagittal images cover from the clivus through T5 caudally. There is anterior fusion hardware spanning from C3 through C5 and posterior fusion hardware spanning from C2 through C5. There is osseous fusion across the C3-6 disc spaces. The facets are obscured at the operative levels posteriorly due to susceptibility artifact. There is reversal of the usual lordosis at the C6 level. Marrow signal at the nonoperative levels is unremarkable. T5 superior endplate fracture is noted with lack of edema indicating chronicity; this is new since 2016, however. Cerebellar tonsils are low lying, unchanged. Extra-axial posterior fossa mass is unchanged, probably meningioma. Myelomalacia again noted at the C4 level of the cord. Cord is normal in caliber grossly. Degenerative changes are as follows: C2-3: No significant foraminal or spinal canal stenosis. C3-4: No significant foraminal or spinal canal stenosis. C4-5: Bilateral facet and uncovertebral arthropathy. At least moderate bilateral foraminal stenosis. Spinal canal stenosis relieved by laminectomy. C5-6: Prominent posterior disc osteophyte complex and bilateral facet and uncovertebral arthropathy. At least moderate bilateral foraminal stenosis. No significant spinal canal stenosis, apparently relieved by laminectomies. C6-7: Prominent posterior disc osteophyte complex and facet and uncovertebral arthropathy. Mild foraminal stenosis. Severe spinal canal stenosis. C7/T1: No significant foraminal or spinal canal stenosis. Incidental imaging of the cervical soft tissues is unremarkable. Incompletely visualized bilateral shoulder joint effusions.  X-Ray Cervical Spine on 07/25/2018: AP, lateral, both oblique, and odontoid views of the cervical spine reveal hardware anteriorly and posteriorly fusing the upper cervical spine. There is no evidence of hardware complication or malalignment. The odontoid is  suboptimal in its visualization due to the hardware. No gross malalignment identified. The odontoid is not well seen. There are degenerative changes identified at the C4/C5 level. There is disc space narrowing and sclerosis of the endplates of C4 and C5. Minimal degenerative changes seen within the posterior facets. Slight reversal seen of the normal lordosis of the cervical spine.      MRI Brain w/o Contrast on 05/24/2018: There is a uniformly enhancing mass in the posterior fossa to the left of midline. This has a dural attachment with uniform enhancement typical of a meningioma. The meningioma measures 16.4 x 17.9 cm. This is slightly larger than on the previous examination of 04/24/2017 when the measurements were 12.5 x 15.4 cm. This process produces minimal mass effect and there is no supratentorial abnormality.

## 2018-08-25 NOTE — PROGRESS NOTES
"Chief Complaint: \"Pain in my neck and left arm.\"        History of Present Illness: Ms. Bianka Chan, 73 y.o. female,  originally referred by Dr. Stephen Dey in consultation for intractable chronic left arm pain. Patient was last seen on 07/12/2017, for left stellate ganglion block. As per last telephone note, patient's left upper extremity pain was significantly improved after her block. Then, patient did not return for follow up with me or Dr. Dey.  She just recently underwent neurosurgical consultation with Dr Dey on 07/25/2018, and was found not to be a surgical candidate. Patient returns to the clinic with a chief complaint of left upper extremity pain. Patient reports that she did well with the blocks but later on, 2-3 months after, her pain recurred.  Pain history: Patient reports a 2 year history of pain, which began after falling several times and some physical therapy session for her neck.   Pain description: constant pain with intermittent exacerbation, described as burning sensation.   Radiation of pain: The pain radiates from the neck, to the left shoulder, and posterior aspect of the left arm all the way down to her hand.   Pain intensity today: 8/10  Average pain intensity last week: 7/10  Pain intensity ranges from: 3/10 to 10/10  Aggravating factors: Pain increases with any activities   Alleviating factors: Pain decreases with lying and analgesics.     Associated symptoms:   Patient reports pain, numbness and weakness in the left upper extremity   Patient denies any new bladder or bowel problems.   Patient reports difficulties with her balance but denies recent falls.      Review of previous therapies and additional medical records:  Bianka Chan has already failed the following measures, including:  Conservative measures: oral analgesics, opioids, physical therapy, ice and heat   Interventional measures: Patient underwent left stellate ganglion blocks on 07/12/2017, 06/19/2017, " 05/17/2017, and 05/08/2017, with significant pain relief and functional improvement of her left upper extremity pain.   Surgical measures: Patient underwent four cervical spine surgeries. Her last surgery, on 04/27/2016 decompressive laminectomies and segmental posterolateral fusion with vertex screws, rods and morselized bone graft C2-C3/C5-C6.  Bianka Chan underwent neurosurgical consultation with Dr. Stephen Dey on 07/25/2017, and more recently on 07/25/2018 and was found not to be a surgical candidate.  Bianka Chan presents with significant comorbidities including cervical myelopathy, gait disturbance, anemia, hypertension, asthma osteoarthritis, osteoporosis; engaged in treatment.  In terms of current analgesics, Bianka Chan takes: Tylenol #3, diclofenac. Patient failed several medications including gabapentin, Tegretol, Lyrica, to name a few   I have reviewed [facility] Report #[id number] consistent to medication reconciliation.     Global Pain Scale 08-30-18                  Pain  18                 Feelings  8                 Clinical outcomes  18                 Activities  22                 GPS Total:  66                    Review of New Diagnostic Studies:    MRI Cervical Spine w/o Contrast on 07/25/2018: I have reviewed the images. Sagittal images cover from the clivus through T5 caudally. Anterior fusion hardware from C3 through C5 and posterior fusion hardware spanning from C2 through C5. There is osseous fusion across the C3-C6 disc spaces. The facets are obscured at the operative levels posteriorly due to susceptibility artifact. There is reversal of the usual lordosis at the C6 level. Marrow signal at the nonoperative levels is unremarkable. T5 superior endplate fracture is noted with lack of edema indicating chronicity; this is new since 2016, however. Cerebellar tonsils are low lying unchanged. Extra-axial posterior fossa mass is unchanged, probably meningioma. Myelomalacia again  noted at the C4 level of the cord. Cord is normal in caliber grossly. Degenerative changes are as follows:   C2-C3: No significant foraminal or spinal canal stenosis.   C3-C4: No significant foraminal or spinal canal stenosis.   C4-C5: Bilateral facet and uncovertebral arthropathy. Moderate bilateral foraminal stenosis. Spinal canal stenosis relieved by laminectomy.   C5-C6: Prominent posterior disc osteophyte complex and bilateral facet and uncovertebral arthropathy. Moderate bilateral foraminal stenosis. No significant spinal canal stenosis, apparently relieved by laminectomies.   C6-C7: Prominent posterior disc osteophyte complex and facet and uncovertebral arthropathy. Mild foraminal stenosis. Severe spinal canal stenosis.  C7/T1: No significant foraminal or spinal canal stenosis.   X-Ray Cervical Spine on 07/25/2018: AP, lateral, both oblique, and odontoid views of the cervical spine reveal hardware anteriorly and posteriorly fusing the upper cervical spine. There is no evidence of hardware complication or malalignment. The odontoid is suboptimal in its visualization due to the hardware. No gross malalignment identified. The odontoid is not well seen. There are degenerative changes identified at the C4/C5 level. There is disc space narrowing and sclerosis of the endplates of C4 and C5. Minimal degenerative changes seen within the posterior facets. Slight reversal seen of the normal lordosis of the cervical spine.      MRI Brain w/o Contrast on 05/24/2018: uniformly enhancing mass in the posterior fossa to the left of midline. This has a dural attachment with uniform enhancement typical of a meningioma. The meningioma measures 16.4 x 17.9 cm. This is slightly larger than on the previous examination of 04/24/2017 when the measurements were 12.5 x 15.4 cm. This process produces minimal mass effect and there is no supratentorial abnormality.     Review of Previous Diagnostic Studies:   X-Ray Cervical Spine Complete  w/ Flex/Ext on 04/24/2017: Anterior and posterior fusion hardware are present at C2, C3, C4, and C5. The anterior fusion hardware and cerclage wires are intact. New posterior fusion hardware is present. There are no areas of osteolysis to suggest loosening. The prevertebral soft tissues are normal. There is mild reversal of the normal cervical lordosis in the lower cervical region. The cervicothoracic junction is normal. A normal relationship between C1 and C2 is present. There is limited range of motion with flexion and extension.   MRI Brain w/ & w/o Contrast on 04/24/2017: There are no areas of restricted diffusion. The midline structures are central. There is no midline shift. Metallic artifact is present in the left suboccipital region. T2 sequences reveal normal parenchymal signal intensity. The globes and orbits are normal. The paranasal sinuses and mastoid air cells are well pneumatized. Normal flow voids are present in the distal internal carotid arteries and basilar artery. The superior sagittal sinus is widely patent. The corpus callosum, pituitary gland, and foramen magnum are normal. Hardware is present in the cervical region. After contrast, and extra-axial lesion is present in the left posterior fossa measuring approximately 1.5 x 1.2 cm in cross-sectional area; unchanged since 02/22/2016. There are no other abnormal areas of enhancement.  X-Ray Cervical Spine on 04/27/2016- In addition to the previous anterior fusion from C3 through C5, a new posterior fusion has been performed from C2 through C5. Hardware is well positioned and the alignment is appropriate in the lateral view.      Review of Systems   Gastrointestinal: Positive for constipation and diarrhea.   Endocrine: Positive for polyuria.   Genitourinary: Positive for frequency and urgency.   Musculoskeletal:        Left arm pain   Neurological: Positive for numbness.   Hematological: Bruises/bleeds easily.   Psychiatric/Behavioral: The patient  is nervous/anxious.         Depression   All other systems reviewed and are negative.        Patient Active Problem List   Diagnosis   • Pain in both upper extremities   • Meningioma (CMS/HCC)   • Spinal stenosis in cervical region   • Cervical spondylosis with myelopathy   • Complex regional pain syndrome type 1 of left upper extremity   • History of fusion of cervical spine   • Cervical myelopathy (CMS/HCC)   • Physical deconditioning   • Central pain syndrome   • Gait disturbance   • At high risk for falls   • Bilateral occipital neuralgia       Past Medical History:   Diagnosis Date   • Anemia    • Arthritis    • Asthma    • Extremity pain    • History of blood transfusion    • Hypertension    • Osteoarthritis    • Osteoporosis          Past Surgical History:   Procedure Laterality Date   • CERVICAL LAMINECTOMY DECOMPRESSION POSTERIOR  04/27/2016    C2-C3, C3-C4,C4-C5 and C5-C6   • HYSTERECTOMY     • TONSILLECTOMY           Family History   Problem Relation Age of Onset   • No Known Problems Mother    • No Known Problems Father          Social History     Social History   • Marital status:      Social History Main Topics   • Smoking status: Never Smoker   • Smokeless tobacco: Never Used   • Alcohol use No   • Drug use: No   • Sexual activity: Defer     Other Topics Concern   • Not on file           Current Outpatient Prescriptions:   •  acetaminophen-codeine (TYLENOL #3) 300-30 MG per tablet, Take 1 tablet by mouth As Needed for Moderate Pain (4-6)., Disp: , Rfl:   •  albuterol (PROVENTIL HFA;VENTOLIN HFA) 108 (90 BASE) MCG/ACT inhaler, Inhale 2 puffs as needed for wheezing., Disp: , Rfl:   •  atorvastatin (LIPITOR) 20 MG tablet, , Disp: , Rfl:   •  calcium carbonate (OS-SUSAN) 600 MG tablet, Take 600 mg by mouth daily., Disp: , Rfl:   •  Cyanocobalamin 1000 MCG/ML kit, Inject  as directed., Disp: , Rfl:   •  Denosumab (PROLIA SC), Inject  under the skin every 6 (six) months., Disp: , Rfl:   •  DICLOFENAC  "SODIUM PO, Take 75 mg by mouth daily., Disp: , Rfl:   •  escitalopram (LEXAPRO) 20 MG tablet, Take 20 mg by mouth daily., Disp: , Rfl:   •  fluticasone (FLONASE) 50 MCG/ACT nasal spray, 2 sprays into each nostril as needed for rhinitis. Administer 2 sprays in each nostril for each dose., Disp: , Rfl:   •  Prenatal Vit-Fe Fumarate-FA (PRENATAL, CLASSIC, VITAMIN) 28-0.8 MG tablet tablet, Take 1 tablet by mouth daily., Disp: , Rfl:   •  enalapril (VASOTEC) 5 MG tablet, Take 5 mg by mouth daily., Disp: , Rfl:       Allergies   Allergen Reactions   • Inderal [Propranolol]    • Penicillins    • Phenergan [Promethazine]       /72   Pulse 58   Temp 97.5 °F (36.4 °C) (Temporal Artery )   Resp 18   Ht 157.5 cm (62\")   Wt 55.8 kg (123 lb)   SpO2 98%   BMI 22.50 kg/m²       Physical Exam:  Constitutional: Patient is oriented to person, place, and time. Patient appears well-developed and well-nourished.   HENT: Head: Normocephalic and atraumatic. Eyes: Conjunctivae and lids are normal. Pupils: Equal, round, reactive to light.   Neck: Trachea normal. Neck supple. No JVD present.   Pulmonary Respiratory effort: No increased work of breathing or signs of respiratory distress. Auscultation of lungs: Clear to auscultation.   Cardiovascular Auscultation of heart: Normal rate and rhythm, normal S1 and S2, no murmurs.   Peripheral vascular exam: Normal. No edema.   Lymphatic: No cervical adenopathy  Musculoskeletal   Gait and station: Gait evaluation demonstrated an abnormal gait. Walker dependent  Cervical spine: Passive and active range of motion is limited due to fusion.   Shoulders: The range of motion of the glenohumeral joints is full and without pain. Rotator cuff strength is 5/5.   Neurological: Patient is alert and oriented to person, place, and time. Speech: speech is normal. Cortical function: Normal mental status.   Cranial nerves: Cranial nerves 2-12 intact.   Reflex Scores:  Right brachioradialis: 3+  Left " brachioradialis: 3+  Right biceps: 3+  Left biceps: 3+  Right triceps: 3+  Left triceps: 3+  Right patellar: 3+  Left patellar: 3+  Right achilles: 3+  Left achilles: 3+  Motor strength: 5/5  Motor Tone: normal tone.   Involuntary movements: none.   Superficial/Primitive Reflexes: primitive reflexes were absent.   Right Palomares: absent  Left Palomares: absent  Right ankle clonus: absent  Left ankle clonus: absent   Spurling sign is negative. Lhermitte sign is negative. Straight leg raising test is negative.   Sensation: No sensory loss. Sensory exam: intact to light touch, intact pain and temperature sensation, intact vibration sensation and normal proprioception.   There is no hyperalgesia, hyperalgesia or allodynia at this time. There is decreased temperature of the left hand, brittle nails, glossy skin,  Coordination: Normal finger to nose and heel to shin. Abormal balance. Romberg's sign negative.   Skin and subcutaneous tissue: Skin is warm and intact. No rash noted. No cyanosis.   Psychiatric: Judgment and insight: Normal. Orientation to person, place and time: Normal. Recent and remote memory: Intact. Mood and affect: Normal.     ASSESSMENT:   1. Spinal stenosis in cervical region    2. History of fusion of cervical spine    3. Central pain syndrome    4. Cervical spondylosis with myelopathy    5. Meningioma (CMS/HCC)    6. Gait disturbance    7. At high risk for falls    8. Physical deconditioning       PLAN/MEDICAL DECISION MAKING: I had a lengthy conversation with Ms. Bianka Chan regarding her chronic pain condition and potential therapeutic options including risks, benefits, alternative therapies, to name a few. Patient has failed to obtain pain relief with conservative measures, as referenced above. She initially presented with CRPS type I, as per [place] criteria, of her left upper extremity which has improved after a series of left stellate ganglion blocks, as referenced above. She also presented  with elements indicating central pain syndrome. MRI of the cervical spine on 07/252018 revealed a s/p ACDF at C3-C4 and C5-C6. Mild kyphotic deformity. Some spinal stenosis at C5-C6 without spinal cord compression. She has spinal cord signal change at the C3-C4 level. C6-C7: Prominent posterior disc osteophyte complex and facet and uncovertebral arthropathy. Mild foraminal stenosis. Severe spinal canal stenosis. X-rays of the cervical spine dated 07-25-18 shows anterior discectomy at C3-C4 and C4-C5.  She has posterior screws in the C2, C4 and C5 levels.  She has had a occipitocervical fusion. Therefore, I have proposed the following plan:  1.  Interventional pain management measures: Patient will be scheduled for cervical epidural steroid injection at C7-T1 by left paramedian interlaminar approach. We may repeat epidural depending on patient's outcome. If she continues to struggle with pain, we may consider a spinal cord stimulator trial. Patient has received a DVD with information regarding SCS therapies.  2. Long-term rehabilitation efforts:  A. The patient has a history of falls. I did complete a risk assessment for falls. Fall precautions: Patient has been instructed regarding universal fall precautions, such as;  · Using gait aids a rolling walker at the appropriate height at all times for ambulation or wheelchair  · Removing all area rugs and coffee tables to create a safe environment at home  · Ensure clean, dry floors  · Wearing supportive footwear and properly fitting clothing  · Ensure bed/chair is appropriate height and patient's feet can touch the floor  · Using a shower transfer bench  · Using walk-in shower and having shower safety bars installed  · Ensure proper lighting, minimize glare  · Have nightlights operational and in use  · Participation in an exercise program for gait training, balance training and strength  · Avoid carrying laundry up and down steps  · Ensure proper compliance and  organization of medications to avoid errors   · Avoid use of over the counter sedatives and alcohol consumption  · Ensure easy access to call bell, glasses, TV control, telephone  · Ensure glasses/hearing aids are in use or close by (on top of night table)  B.   Patient will start a comprehensive physical therapy program for gait and balance training, desensitization techniques and ultrasound once pain is under control  C.  Referral to Dr. Fair for psychological clearance for SCS and CBT  3.   Pharmacological measures: Patient takes  Tylenol #3, diclofenac. Patient has failed several medications including gabapentin, Tegretol, Lyrica, to name a few. She has declined additional pharmacological measures.   4. The patient has been instructed to contact my office with any questions or difficulties. The patient understands the plan and agrees to proceed accordingly.       Patient Care Team:  Juliocesar Alfaro III, MD as PCP - General  RomeoEdis acuna MD as Consulting Physician (Neurosurgery)  Slick Brown MD as Consulting Physician (Pain Medicine)  Stephen Dey MD as Consulting Physician (Neurosurgery)     No orders of the defined types were placed in this encounter.        Future Appointments  Date Time Provider Department Center   8/30/2018 1:45 PM Slick Brwon MD [facility] None         Slick Brown MD     EMR Dragon/Transcription disclaimer:  Much of this encounter note is an electronic transcription of spoken language to printed text. Electronic transcription of spoken language may permit erroneous, or at times, nonsensical words or phrases to be inadvertently transcribed. Although I have reviewed the note for such errors, some may still exist.

## 2018-08-26 PROBLEM — M54.81 BILATERAL OCCIPITAL NEURALGIA: Status: ACTIVE | Noted: 2018-08-26

## 2018-08-30 ENCOUNTER — OFFICE VISIT (OUTPATIENT)
Dept: PAIN MEDICINE | Facility: CLINIC | Age: 73
End: 2018-08-30

## 2018-08-30 VITALS
SYSTOLIC BLOOD PRESSURE: 122 MMHG | BODY MASS INDEX: 22.63 KG/M2 | HEIGHT: 62 IN | DIASTOLIC BLOOD PRESSURE: 72 MMHG | TEMPERATURE: 97.5 F | RESPIRATION RATE: 18 BRPM | WEIGHT: 123 LBS | HEART RATE: 58 BPM | OXYGEN SATURATION: 98 %

## 2018-08-30 DIAGNOSIS — D32.9 MENINGIOMA (HCC): ICD-10-CM

## 2018-08-30 DIAGNOSIS — R26.9 GAIT DISTURBANCE: ICD-10-CM

## 2018-08-30 DIAGNOSIS — Z91.81 AT HIGH RISK FOR FALLS: ICD-10-CM

## 2018-08-30 DIAGNOSIS — M48.02 SPINAL STENOSIS IN CERVICAL REGION: ICD-10-CM

## 2018-08-30 DIAGNOSIS — G89.0 CENTRAL PAIN SYNDROME: ICD-10-CM

## 2018-08-30 DIAGNOSIS — M47.12 CERVICAL SPONDYLOSIS WITH MYELOPATHY: ICD-10-CM

## 2018-08-30 DIAGNOSIS — R53.81 PHYSICAL DECONDITIONING: ICD-10-CM

## 2018-08-30 DIAGNOSIS — Z98.1 HISTORY OF FUSION OF CERVICAL SPINE: ICD-10-CM

## 2018-08-30 PROCEDURE — 99214 OFFICE O/P EST MOD 30 MIN: CPT | Performed by: ANESTHESIOLOGY

## 2018-09-05 ENCOUNTER — OUTSIDE FACILITY SERVICE (OUTPATIENT)
Dept: PAIN MEDICINE | Facility: CLINIC | Age: 73
End: 2018-09-05

## 2018-09-05 PROCEDURE — 62321 NJX INTERLAMINAR CRV/THRC: CPT | Performed by: ANESTHESIOLOGY

## 2018-09-05 PROCEDURE — 99152 MOD SED SAME PHYS/QHP 5/>YRS: CPT | Performed by: ANESTHESIOLOGY

## 2018-09-06 ENCOUNTER — TELEPHONE (OUTPATIENT)
Dept: PAIN MEDICINE | Facility: CLINIC | Age: 73
End: 2018-09-06

## 2018-09-06 NOTE — TELEPHONE ENCOUNTER
Patient had cervical epidural steroid injection on 09/05/2018. Patient reports she did good. She is not having any pain. Advised to begin physical therapy as soon as possible. Patient verbalized understanding and stated she has appointment already set up. She has f/u appointment on 09/27/2018.

## 2018-09-26 ENCOUNTER — TELEPHONE (OUTPATIENT)
Dept: PAIN MEDICINE | Facility: CLINIC | Age: 73
End: 2018-09-26

## 2018-09-26 NOTE — PROGRESS NOTES
"Chief Complaint: \"I did pretty well with the injection.  The pain in my left shoulder has improved after the injection.\"     History of Present Illness: Ms. Bianka Chan, 73 y.o. female,  originally referred by Dr. Stephen Dey in consultation for intractable chronic left arm pain. Patient was last seen on 09/05/2018 for C7-T1 epidural steroid injection by left paramedian interlaminar approach and experienced approximately 40% pain relief and functional improvement that is ongoing.  Patient reports that left shoulder pain has improved.  She is participating in PT.  Pain history: Patient reports a 2 year history of pain, which began after falling several times and some physical therapy session for her neck.   Pain description: constant pain with intermittent exacerbation, described as burning sensation.   Radiation of pain: The pain radiates from the neck, to the left shoulder, and posterior aspect of the left arm all the way down to her hand.   Pain intensity today: 4/10  Average pain intensity last week: 4/10  Pain intensity ranges from: 4/10 to 4/10  Aggravating factors: Pain increases with any activities   Alleviating factors: Pain decreases with ice, lying, and analgesics.     Associated symptoms:   Patient reports pain, numbness and weakness in the left upper extremity.  Denies right-sided symptoms.  Patient denies any new bladder or bowel problems.   Patient reports difficulties with her balance but denies recent falls.      Review of previous therapies and additional medical records:  Bianka Chan has already failed the following measures, including:  Conservative measures: oral analgesics, opioids, physical therapy, ice and heat   Interventional measures: As referenced above.  Patient underwent left stellate ganglion blocks on 07/12/2017, 06/19/2017, 05/17/2017, and 05/08/2017, with significant pain relief and functional improvement of her left upper extremity pain.   Surgical measures: Patient " underwent four cervical spine surgeries. Her last surgery, on 04/27/2016 decompressive laminectomies and segmental posterolateral fusion with vertex screws, rods and morselized bone graft C2-C3/C5-C6.  Bianka Chan underwent neurosurgical consultation with Dr. Stephen Dey on 07/25/2017, and more recently on 07/25/2018 and was found not to be a surgical candidate.  Bianka Chan presents with significant comorbidities including cervical myelopathy, gait disturbance, anemia, hypertension, asthma osteoarthritis, osteoporosis; engaged in treatment.  In terms of current analgesics, Bianka Chan takes: diclofenac.   I have reviewed [facility] Report #[id number] consistent to medication reconciliation.     Global Pain Scale 08-30-18 09-               Pain  18  23               Feelings  8  3               Clinical outcomes  18  2               Activities  22  0               GPS Total:  66  28                  Review of Diagnostic Studies:    MRI Cervical Spine w/o Contrast on 07/25/2018:  Sagittal images cover from the clivus through T5 caudally. Anterior fusion hardware from C3 through C5 and posterior fusion hardware spanning from C2 through C5. There is osseous fusion across the C3-C6 disc spaces. The facets are obscured at the operative levels posteriorly due to susceptibility artifact. There is reversal of the usual lordosis at the C6 level. Marrow signal at the nonoperative levels is unremarkable. T5 superior endplate fracture is noted with lack of edema indicating chronicity; this is new since 2016, however. Cerebellar tonsils are low lying unchanged. Extra-axial posterior fossa mass is unchanged, probably meningioma. Myelomalacia again noted at the C4 level of the cord. Cord is normal in caliber grossly. Degenerative changes are as follows:   C2-C3: No significant foraminal or spinal canal stenosis.   C3-C4: No significant foraminal or spinal canal stenosis.   C4-C5: Bilateral facet and  uncovertebral arthropathy. Moderate bilateral foraminal stenosis. Spinal canal stenosis relieved by laminectomy.   C5-C6: Prominent posterior disc osteophyte complex and bilateral facet and uncovertebral arthropathy. Moderate bilateral foraminal stenosis. No significant spinal canal stenosis, apparently relieved by laminectomies.   C6-C7: Prominent posterior disc osteophyte complex and facet and uncovertebral arthropathy. Mild foraminal stenosis. Severe spinal canal stenosis.  C7/T1: No significant foraminal or spinal canal stenosis.   X-Ray Cervical Spine on 07/25/2018: AP, lateral, both oblique, and odontoid views of the cervical spine reveal hardware anteriorly and posteriorly fusing the upper cervical spine. There is no evidence of hardware complication or malalignment. The odontoid is suboptimal in its visualization due to the hardware. No gross malalignment identified. The odontoid is not well seen. There are degenerative changes identified at the C4/C5 level. There is disc space narrowing and sclerosis of the endplates of C4 and C5. Minimal degenerative changes seen within the posterior facets. Slight reversal seen of the normal lordosis of the cervical spine.      MRI Brain w/o Contrast on 05/24/2018: uniformly enhancing mass in the posterior fossa to the left of midline. This has a dural attachment with uniform enhancement typical of a meningioma. The meningioma measures 16.4 x 17.9 cm. This is slightly larger than on the previous examination of 04/24/2017 when the measurements were 12.5 x 15.4 cm. This process produces minimal mass effect and there is no supratentorial abnormality.         Review of Systems   Endocrine: Positive for polyuria.   Musculoskeletal:        Left arm pain   Allergic/Immunologic: Positive for environmental allergies.   Neurological: Positive for numbness and headaches.   Hematological: Bruises/bleeds easily.   Psychiatric/Behavioral: The patient is nervous/anxious.          Depression   All other systems reviewed and are negative.        Patient Active Problem List   Diagnosis   • Pain in both upper extremities   • Meningioma (CMS/HCC)   • Spinal stenosis in cervical region   • Cervical spondylosis with myelopathy   • Complex regional pain syndrome type 1 of left upper extremity   • History of fusion of cervical spine   • Cervical myelopathy (CMS/HCC)   • Physical deconditioning   • Central pain syndrome   • Gait disturbance   • At high risk for falls   • Bilateral occipital neuralgia       Past Medical History:   Diagnosis Date   • Anemia    • Arthritis    • Asthma    • Extremity pain    • History of blood transfusion    • Hypertension    • Osteoarthritis    • Osteoporosis          Past Surgical History:   Procedure Laterality Date   • CERVICAL LAMINECTOMY DECOMPRESSION POSTERIOR  04/27/2016    C2-C3, C3-C4,C4-C5 and C5-C6   • HYSTERECTOMY     • TONSILLECTOMY           Family History   Problem Relation Age of Onset   • No Known Problems Mother    • No Known Problems Father          Social History     Social History   • Marital status:      Social History Main Topics   • Smoking status: Never Smoker   • Smokeless tobacco: Never Used   • Alcohol use No   • Drug use: No   • Sexual activity: Defer     Other Topics Concern   • Not on file           Current Outpatient Prescriptions:   •  acetaminophen-codeine (TYLENOL #3) 300-30 MG per tablet, Take 1 tablet by mouth As Needed for Moderate Pain (4-6)., Disp: , Rfl:   •  albuterol (PROVENTIL HFA;VENTOLIN HFA) 108 (90 BASE) MCG/ACT inhaler, Inhale 2 puffs as needed for wheezing., Disp: , Rfl:   •  atorvastatin (LIPITOR) 20 MG tablet, , Disp: , Rfl:   •  calcium carbonate (OS-SUSAN) 600 MG tablet, Take 600 mg by mouth daily., Disp: , Rfl:   •  Cyanocobalamin 1000 MCG/ML kit, Inject  as directed., Disp: , Rfl:   •  Denosumab (PROLIA SC), Inject  under the skin every 6 (six) months., Disp: , Rfl:   •  DICLOFENAC SODIUM PO, Take 75 mg by  "mouth daily., Disp: , Rfl:   •  enalapril (VASOTEC) 5 MG tablet, Take 5 mg by mouth daily., Disp: , Rfl:   •  escitalopram (LEXAPRO) 20 MG tablet, Take 20 mg by mouth daily., Disp: , Rfl:   •  fluticasone (FLONASE) 50 MCG/ACT nasal spray, 2 sprays into each nostril as needed for rhinitis. Administer 2 sprays in each nostril for each dose., Disp: , Rfl:   •  Prenatal Vit-Fe Fumarate-FA (PRENATAL, CLASSIC, VITAMIN) 28-0.8 MG tablet tablet, Take 1 tablet by mouth daily., Disp: , Rfl:       Allergies   Allergen Reactions   • Inderal [Propranolol]    • Penicillins    • Phenergan [Promethazine]       /72   Pulse 68   Temp 97.6 °F (36.4 °C) (Temporal Artery )   Resp 18   Ht 157.5 cm (62\")   Wt 56.2 kg (123 lb 12.8 oz)   SpO2 98%   BMI 22.64 kg/m²       Physical Exam:  Constitutional: Patient is oriented to person, place, and time. Patient appears well-developed and well-nourished.   HENT: Head: Normocephalic and atraumatic. Eyes: Conjunctivae and lids are normal. Pupils: Equal, round, reactive to light.   Neck: Trachea normal. Neck supple. No JVD present.   Pulmonary Respiratory effort: No increased work of breathing or signs of respiratory distress. Auscultation of lungs: Clear to auscultation.   Cardiovascular Auscultation of heart: Normal rate and rhythm, normal S1 and S2, no murmurs.   Peripheral vascular exam: Normal. No edema.   Lymphatic: No cervical adenopathy  Musculoskeletal   Gait and station: Gait evaluation demonstrated an abnormal gait. Walker dependent  Cervical spine: Passive and active range of motion is limited due to fusion.   Shoulders: The range of motion of the glenohumeral joints is full and without pain. Rotator cuff strength is 5/5.   Neurological: Patient is alert and oriented to person, place, and time. Speech: speech is normal. Cortical function: Normal mental status.   Cranial nerves: Cranial nerves 2-12 intact.   Reflex Scores:  Right brachioradialis: 3+  Left brachioradialis: " 3+  Right biceps: 3+  Left biceps: 3+  Right triceps: 3+  Left triceps: 3+  Right patellar: 3+  Left patellar: 3+  Right achilles: 3+  Left achilles: 3+  Motor strength: 5/5  Motor Tone: normal tone.   Involuntary movements: none.   Superficial/Primitive Reflexes: primitive reflexes were absent.   Right Palomares: absent  Left Palomares: absent  Right ankle clonus: absent  Left ankle clonus: absent   Spurling sign is negative. Lhermitte sign is negative. Straight leg raising test is negative.   Sensation: No sensory loss. Sensory exam: intact to light touch, intact pain and temperature sensation, intact vibration sensation and normal proprioception.   There is no hyperalgesia, hyperalgesia or allodynia at this time. There is decreased temperature of the left hand, brittle nails, glossy skin,  Coordination: Normal finger to nose and heel to shin. Abormal balance. Romberg's sign negative.   Skin and subcutaneous tissue: Skin is warm and intact. No rash noted. No cyanosis.   Psychiatric: Judgment and insight: Normal. Orientation to person, place and time: Normal. Recent and remote memory: Intact. Mood and affect: Normal.     ASSESSMENT:   1. Spinal stenosis in cervical region    2. History of fusion of cervical spine    3. Central pain syndrome    4. Cervical spondylosis with myelopathy    5. Meningioma (CMS/HCC)    6. Gait disturbance    7. At high risk for falls    8. Physical deconditioning       PLAN/MEDICAL DECISION MAKING:   I have reviewed the above medical records and discussed the patient with Dr. Brown.  1.  Interventional pain management measures: None indicated at this time.  We would repeat epidural in the future if pain recurs.  If she continues to struggle with pain, we may consider a spinal cord stimulator trial. Patient has received a DVD with information regarding SCS therapies.  2. Long-term rehabilitation efforts:  A. The patient has a history of falls. I did complete a risk assessment for falls.   Patient has been instructed regarding universal fall precautions.  B.   Patient will continue comprehensive physical therapy program for gait and balance training, desensitization techniques and ultrasound.  C.  Patient has been referred to Dr. Fair for psychological clearance for SCS and CBT  3.   Pharmacological measures: Patient takes diclofenac. Patient has failed several medications including gabapentin, Tegretol, Lyrica, to name a few. She has declined additional pharmacological measures.   4. The patient has been instructed to contact my office with any questions or difficulties. The patient understands the plan and agrees to proceed accordingly.       Patient Care Team:  Juliocesar Alfaro III, MD as PCP - General  RomeoEdis acuna MD as Consulting Physician (Neurosurgery)  Slick Brown MD as Consulting Physician (Pain Medicine)  Stephen eDy MD as Consulting Physician (Neurosurgery)  Tian Pham PA-C as Physician Assistant (Physician Assistant)     No orders of the defined types were placed in this encounter.        No future appointments.      Tian Pham PA-C     EMR Dragon/Transcription disclaimer:  Much of this encounter note is an electronic transcription of spoken language to printed text. Electronic transcription of spoken language may permit erroneous, or at times, nonsensical words or phrases to be inadvertently transcribed. Although I have reviewed the note for such errors, some may still exist.

## 2018-09-27 ENCOUNTER — OFFICE VISIT (OUTPATIENT)
Dept: PAIN MEDICINE | Facility: CLINIC | Age: 73
End: 2018-09-27

## 2018-09-27 VITALS
TEMPERATURE: 97.6 F | SYSTOLIC BLOOD PRESSURE: 128 MMHG | OXYGEN SATURATION: 98 % | WEIGHT: 123.8 LBS | RESPIRATION RATE: 18 BRPM | HEART RATE: 68 BPM | HEIGHT: 62 IN | BODY MASS INDEX: 22.78 KG/M2 | DIASTOLIC BLOOD PRESSURE: 72 MMHG

## 2018-09-27 DIAGNOSIS — D32.9 MENINGIOMA (HCC): ICD-10-CM

## 2018-09-27 DIAGNOSIS — M48.02 SPINAL STENOSIS IN CERVICAL REGION: Primary | ICD-10-CM

## 2018-09-27 DIAGNOSIS — M47.12 CERVICAL SPONDYLOSIS WITH MYELOPATHY: ICD-10-CM

## 2018-09-27 DIAGNOSIS — Z91.81 AT HIGH RISK FOR FALLS: ICD-10-CM

## 2018-09-27 DIAGNOSIS — Z98.1 HISTORY OF FUSION OF CERVICAL SPINE: ICD-10-CM

## 2018-09-27 DIAGNOSIS — G89.0 CENTRAL PAIN SYNDROME: ICD-10-CM

## 2018-09-27 DIAGNOSIS — R53.81 PHYSICAL DECONDITIONING: ICD-10-CM

## 2018-09-27 DIAGNOSIS — R26.9 GAIT DISTURBANCE: ICD-10-CM

## 2018-09-27 PROCEDURE — 99213 OFFICE O/P EST LOW 20 MIN: CPT | Performed by: PHYSICIAN ASSISTANT

## 2019-02-25 ENCOUNTER — TELEPHONE (OUTPATIENT)
Dept: NEUROSURGERY | Facility: CLINIC | Age: 74
End: 2019-02-25

## 2019-02-25 DIAGNOSIS — D32.9 MENINGIOMA (HCC): Primary | ICD-10-CM

## 2019-04-11 ENCOUNTER — OFFICE VISIT (OUTPATIENT)
Dept: NEUROSURGERY | Facility: CLINIC | Age: 74
End: 2019-04-11

## 2019-04-11 ENCOUNTER — HOSPITAL ENCOUNTER (OUTPATIENT)
Dept: MRI IMAGING | Facility: HOSPITAL | Age: 74
Discharge: HOME | End: 2019-04-11
Admitting: NEUROLOGICAL SURGERY

## 2019-04-11 VITALS
DIASTOLIC BLOOD PRESSURE: 80 MMHG | TEMPERATURE: 99.3 F | HEIGHT: 62 IN | SYSTOLIC BLOOD PRESSURE: 142 MMHG | WEIGHT: 123.4 LBS | BODY MASS INDEX: 22.71 KG/M2

## 2019-04-11 DIAGNOSIS — D32.9 MENINGIOMA (HCC): Primary | ICD-10-CM

## 2019-04-11 DIAGNOSIS — D32.9 MENINGIOMA (HCC): ICD-10-CM

## 2019-04-11 PROCEDURE — 0 GADOBENATE DIMEGLUMINE 529 MG/ML SOLUTION: Performed by: NEUROLOGICAL SURGERY

## 2019-04-11 PROCEDURE — 70553 MRI BRAIN STEM W/O & W/DYE: CPT

## 2019-04-11 PROCEDURE — A9577 INJ MULTIHANCE: HCPCS | Performed by: NEUROLOGICAL SURGERY

## 2019-04-11 PROCEDURE — 82565 ASSAY OF CREATININE: CPT

## 2019-04-11 PROCEDURE — 99213 OFFICE O/P EST LOW 20 MIN: CPT | Performed by: NEUROLOGICAL SURGERY

## 2019-04-11 RX ADMIN — GADOBENATE DIMEGLUMINE 10 ML: 529 INJECTION, SOLUTION INTRAVENOUS at 11:38

## 2019-04-11 NOTE — PROGRESS NOTES
Bianka Chan  1945  [id number]                        CHIEF COMPLAINT: Right cerebellar hemisphere a meningioma         MEDICAL HISTORY SINCE LAST ENCOUNTER: She reports for follow-up.  We have been following her for several years for an asymptomatic lesion in the left cerebellar hemisphere.  She remains asymptomatic.  She reports today for continued surveillance.           Past Medical History:   Diagnosis Date   • Anemia    • Arthritis    • Asthma    • Extremity pain    • History of blood transfusion    • Hypertension    • Osteoarthritis    • Osteoporosis    • Osteoporosis               Past Surgical History:   Procedure Laterality Date   • CERVICAL LAMINECTOMY DECOMPRESSION POSTERIOR  04/27/2016    C2-C3, C3-C4,C4-C5 and C5-C6   • HYSTERECTOMY     • TONSILLECTOMY                Family History   Problem Relation Age of Onset   • No Known Problems Mother    • No Known Problems Father               Social History     Socioeconomic History   • Marital status:      Spouse name: Not on file   • Number of children: Not on file   • Years of education: Not on file   • Highest education level: Not on file   Tobacco Use   • Smoking status: Never Smoker   • Smokeless tobacco: Never Used   Substance and Sexual Activity   • Alcohol use: No   • Drug use: No   • Sexual activity: Defer              Allergies   Allergen Reactions   • Inderal [Propranolol]    • Penicillins    • Phenergan [Promethazine]    • Codeine Rash   • Doxycycline Rash              Current Outpatient Medications:   •  acetaminophen-codeine (TYLENOL #3) 300-30 MG per tablet, Take 1 tablet by mouth As Needed for Moderate Pain (4-6)., Disp: , Rfl:   •  albuterol (PROVENTIL HFA;VENTOLIN HFA) 108 (90 BASE) MCG/ACT inhaler, Inhale 2 puffs as needed for wheezing., Disp: , Rfl:   •  atorvastatin (LIPITOR) 20 MG tablet, , Disp: , Rfl:   •  calcium carbonate (OS-SUSAN) 600 MG tablet, Take 600 mg by mouth daily., Disp: , Rfl:   •  Cyanocobalamin 1000  "MCG/ML kit, Inject  as directed., Disp: , Rfl:   •  Denosumab (PROLIA SC), Inject  under the skin every 6 (six) months., Disp: , Rfl:   •  DICLOFENAC SODIUM PO, Take 75 mg by mouth daily., Disp: , Rfl:   •  enalapril (VASOTEC) 5 MG tablet, Take 5 mg by mouth daily., Disp: , Rfl:   •  escitalopram (LEXAPRO) 20 MG tablet, Take 20 mg by mouth daily., Disp: , Rfl:   •  fluticasone (FLONASE) 50 MCG/ACT nasal spray, 2 sprays into each nostril as needed for rhinitis. Administer 2 sprays in each nostril for each dose., Disp: , Rfl:   •  Prenatal Vit-Fe Fumarate-FA (PRENATAL, CLASSIC, VITAMIN) 28-0.8 MG tablet tablet, Take 1 tablet by mouth daily., Disp: , Rfl:   No current facility-administered medications for this visit.          Review of Systems   Constitutional: Positive for chills.   HENT: Positive for mouth sores and rhinorrhea.    Eyes: Positive for photophobia.   Genitourinary: Positive for frequency.   Musculoskeletal: Positive for gait problem.   Allergic/Immunologic: Positive for environmental allergies.   Neurological: Positive for headaches.   Hematological: Bruises/bleeds easily.   Psychiatric/Behavioral: Positive for dysphoric mood.               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  This has remained stable.  She has no symptoms to suggest cerebellar dysfunction.  No dysmetria or significant left-sided ataxia.            MEDICAL DECISION MAKING: The MRI shows very slight enlargement of approximately 0.1 cm in each direction.           ASSESSMENT/DISPOSITION: I will ask her to return to see me in 6 months rather than 1 year.  Should the neoplasm show enlargement I would recommend SRS.  I discussed this with Bianka and " look forward to seeing her at that time.              I APPRECIATE THE OPPORTUNITY OF THIS REFERRAL. PLEASE CALL IF ANY  QUESTIONS [phone]    Scribed for Eids Romeo MD by Karen Johns CMA. 4/11/2019 2:29 PM     I have read and concur with the information provided by the scribe.  Edis Romeo MD

## 2019-04-12 LAB — CREAT BLDA-MCNC: 0.8 MG/DL (ref 0.6–1.3)

## 2019-10-14 ENCOUNTER — HOSPITAL ENCOUNTER (OUTPATIENT)
Dept: MRI IMAGING | Facility: HOSPITAL | Age: 74
Discharge: HOME | End: 2019-10-14
Admitting: NEUROLOGICAL SURGERY

## 2019-10-14 ENCOUNTER — OFFICE VISIT (OUTPATIENT)
Dept: NEUROSURGERY | Facility: CLINIC | Age: 74
End: 2019-10-14

## 2019-10-14 VITALS
BODY MASS INDEX: 21.53 KG/M2 | HEIGHT: 62 IN | WEIGHT: 117 LBS | SYSTOLIC BLOOD PRESSURE: 142 MMHG | TEMPERATURE: 97.3 F | DIASTOLIC BLOOD PRESSURE: 66 MMHG

## 2019-10-14 DIAGNOSIS — R13.10 DYSPHAGIA, UNSPECIFIED TYPE: ICD-10-CM

## 2019-10-14 DIAGNOSIS — D32.9 MENINGIOMA (HCC): Primary | ICD-10-CM

## 2019-10-14 DIAGNOSIS — R26.9 GAIT DIFFICULTY: ICD-10-CM

## 2019-10-14 DIAGNOSIS — D32.9 MENINGIOMA (HCC): ICD-10-CM

## 2019-10-14 PROCEDURE — A9577 INJ MULTIHANCE: HCPCS | Performed by: NEUROLOGICAL SURGERY

## 2019-10-14 PROCEDURE — 82565 ASSAY OF CREATININE: CPT

## 2019-10-14 PROCEDURE — 0 GADOBENATE DIMEGLUMINE 529 MG/ML SOLUTION: Performed by: NEUROLOGICAL SURGERY

## 2019-10-14 PROCEDURE — 99213 OFFICE O/P EST LOW 20 MIN: CPT | Performed by: NEUROLOGICAL SURGERY

## 2019-10-14 PROCEDURE — 70553 MRI BRAIN STEM W/O & W/DYE: CPT

## 2019-10-14 RX ADMIN — GADOBENATE DIMEGLUMINE 10 ML: 529 INJECTION, SOLUTION INTRAVENOUS at 11:34

## 2019-10-14 NOTE — PROGRESS NOTES
Bianka Chan  1945  [id number]                        CHIEF COMPLAINT: Right cerebellar hemisphere meningioma         MEDICAL HISTORY SINCE LAST ENCOUNTER: This 74-year-old female reports for continued surveillance of an asymptomatic lesion in the left cerebral hemisphere consistent with meningioma.  She has seen Dr. Dey in the past and has had this extensive spinal fusions.  She is remained quadriparetic with good and bad days.  The issues at hand are that of difficulty with her vision difficulty with swallowing and unsteady gait.           Past Medical History:   Diagnosis Date   • Anemia    • Arthritis    • Asthma    • Extremity pain    • History of blood transfusion    • Hypertension    • Osteoarthritis    • Osteoporosis    • Osteoporosis               Past Surgical History:   Procedure Laterality Date   • CERVICAL LAMINECTOMY DECOMPRESSION POSTERIOR  04/27/2016    C2-C3, C3-C4,C4-C5 and C5-C6   • HYSTERECTOMY     • TONSILLECTOMY                Family History   Problem Relation Age of Onset   • No Known Problems Mother    • No Known Problems Father               Social History     Socioeconomic History   • Marital status:      Spouse name: Not on file   • Number of children: Not on file   • Years of education: Not on file   • Highest education level: Not on file   Tobacco Use   • Smoking status: Never Smoker   • Smokeless tobacco: Never Used   Substance and Sexual Activity   • Alcohol use: No   • Drug use: No   • Sexual activity: Defer              Allergies   Allergen Reactions   • Inderal [Propranolol]    • Penicillins    • Phenergan [Promethazine]    • Codeine Rash   • Doxycycline Rash              Current Outpatient Medications:   •  acetaminophen-codeine (TYLENOL #3) 300-30 MG per tablet, Take 1 tablet by mouth As Needed for Moderate Pain (4-6)., Disp: , Rfl:   •  albuterol (PROVENTIL HFA;VENTOLIN HFA) 108 (90 BASE) MCG/ACT inhaler, Inhale 2 puffs as needed for wheezing., Disp: , Rfl:   •   atorvastatin (LIPITOR) 20 MG tablet, , Disp: , Rfl:   •  calcium carbonate (OS-SUSAN) 600 MG tablet, Take 600 mg by mouth daily., Disp: , Rfl:   •  Cyanocobalamin 1000 MCG/ML kit, Inject  as directed., Disp: , Rfl:   •  Denosumab (PROLIA SC), Inject  under the skin every 6 (six) months., Disp: , Rfl:   •  DICLOFENAC SODIUM PO, Take 75 mg by mouth daily., Disp: , Rfl:   •  enalapril (VASOTEC) 5 MG tablet, Take 5 mg by mouth daily., Disp: , Rfl:   •  escitalopram (LEXAPRO) 20 MG tablet, Take 20 mg by mouth daily., Disp: , Rfl:   •  fluticasone (FLONASE) 50 MCG/ACT nasal spray, 2 sprays into each nostril as needed for rhinitis. Administer 2 sprays in each nostril for each dose., Disp: , Rfl:   •  Prenatal Vit-Fe Fumarate-FA (PRENATAL, CLASSIC, VITAMIN) 28-0.8 MG tablet tablet, Take 1 tablet by mouth daily., Disp: , Rfl:   No current facility-administered medications for this visit.          Review of Systems   Constitutional: Positive for chills. Negative for activity change, appetite change, diaphoresis, fatigue, fever and unexpected weight change.   HENT: Positive for mouth sores and rhinorrhea. Negative for congestion, dental problem, drooling, ear discharge, ear pain, facial swelling, hearing loss, nosebleeds, postnasal drip, sinus pressure, sneezing, sore throat, tinnitus, trouble swallowing and voice change.    Eyes: Positive for photophobia. Negative for pain, discharge, redness, itching and visual disturbance.   Respiratory: Negative for apnea, cough, choking, chest tightness, shortness of breath, wheezing and stridor.    Cardiovascular: Negative for chest pain, palpitations and leg swelling.   Gastrointestinal: Negative for abdominal distention, abdominal pain, anal bleeding, blood in stool, constipation, diarrhea, nausea, rectal pain and vomiting.   Endocrine: Negative for cold intolerance, heat intolerance, polydipsia, polyphagia and polyuria.   Genitourinary: Positive for frequency. Negative for decreased  "urine volume, difficulty urinating, dysuria, enuresis, flank pain, genital sores, hematuria and urgency.   Musculoskeletal: Positive for gait problem. Negative for arthralgias, back pain, joint swelling, myalgias, neck pain and neck stiffness.   Skin: Negative for color change, pallor, rash and wound.   Allergic/Immunologic: Positive for environmental allergies. Negative for food allergies and immunocompromised state.   Neurological: Positive for headaches. Negative for dizziness, tremors, seizures, syncope, facial asymmetry, speech difficulty, weakness, light-headedness and numbness.   Hematological: Negative for adenopathy. Bruises/bleeds easily.   Psychiatric/Behavioral: Positive for dysphoric mood. Negative for agitation, behavioral problems, confusion, decreased concentration, hallucinations, self-injury, sleep disturbance and suicidal ideas. The patient is not nervous/anxious and is not hyperactive.                Vitals:    10/14/19 1225   Height: 157.5 cm (62\")               EXAMINATION: Unchanged.  No focal deficit associated with this            MEDICAL DECISION MAKING: MRI shows no change in the lesion which is quite stable.           ASSESSMENT/DISPOSITION: The complaints that she has today are unassociated with a cerebellar lesion.  In the past we have talked about stereotactic radiosurgery.  She wishes to pursue that and we will move in that direction in reference to the cerebellar lesion.  She is to see her ophthalmologist and will call me subsequently; we will get a dysphasia evaluation and she will call me subsequently and I have sent her to physical therapy.  I will continue to follow her very carefully and will keep you informed              I APPRECIATE THE OPPORTUNITY OF THIS REFERRAL. PLEASE CALL IF ANY       QUESTIONS [phone]    "

## 2019-10-15 ENCOUNTER — TELEPHONE (OUTPATIENT)
Dept: NEUROSURGERY | Facility: CLINIC | Age: 74
End: 2019-10-15

## 2019-10-15 NOTE — TELEPHONE ENCOUNTER
----- Message from Makenzie Walsh sent at 10/15/2019  1:53 PM EDT -----  Contact: [phone]  PATIENT CALLING TO SAY SHE SAW THE OPHTHALMOLOGIST TODAY AND EVERYTHING WAS OK.

## 2019-10-21 ENCOUNTER — OFFICE VISIT (OUTPATIENT)
Dept: RADIATION ONCOLOGY | Facility: HOSPITAL | Age: 74
End: 2019-10-21

## 2019-10-21 ENCOUNTER — HOSPITAL ENCOUNTER (OUTPATIENT)
Dept: GENERAL RADIOLOGY | Facility: HOSPITAL | Age: 74
Discharge: HOME | End: 2019-10-21
Admitting: NEUROLOGICAL SURGERY

## 2019-10-21 ENCOUNTER — HOSPITAL ENCOUNTER (OUTPATIENT)
Dept: RADIATION ONCOLOGY | Facility: HOSPITAL | Age: 74
Setting detail: RADIATION/ONCOLOGY SERIES
Discharge: HOME | End: 2019-10-21

## 2019-10-21 VITALS
RESPIRATION RATE: 16 BRPM | BODY MASS INDEX: 21.46 KG/M2 | SYSTOLIC BLOOD PRESSURE: 162 MMHG | HEIGHT: 62 IN | TEMPERATURE: 98.2 F | DIASTOLIC BLOOD PRESSURE: 70 MMHG | HEART RATE: 70 BPM | WEIGHT: 116.6 LBS | OXYGEN SATURATION: 96 %

## 2019-10-21 DIAGNOSIS — D32.9 MENINGIOMA (HCC): Primary | ICD-10-CM

## 2019-10-21 DIAGNOSIS — R13.10 DYSPHAGIA, UNSPECIFIED TYPE: ICD-10-CM

## 2019-10-21 PROCEDURE — 92611 MOTION FLUOROSCOPY/SWALLOW: CPT

## 2019-10-21 PROCEDURE — 63710000001 BARIUM SULFATE 40 % RECONSTITUTED SUSPENSION: Performed by: NEUROLOGICAL SURGERY

## 2019-10-21 PROCEDURE — 74230 X-RAY XM SWLNG FUNCJ C+: CPT

## 2019-10-21 PROCEDURE — A9270 NON-COVERED ITEM OR SERVICE: HCPCS | Performed by: NEUROLOGICAL SURGERY

## 2019-10-21 PROCEDURE — 63710000001 BARIUM SULFATE 40 % PASTE: Performed by: NEUROLOGICAL SURGERY

## 2019-10-21 RX ORDER — OXYBUTYNIN CHLORIDE 10 MG/1
TABLET, EXTENDED RELEASE ORAL
COMMUNITY
Start: 2019-10-15

## 2019-10-21 RX ADMIN — BARIUM SULFATE 20 ML: 400 PASTE ORAL at 11:04

## 2019-10-21 RX ADMIN — BARIUM SULFATE 100 ML: 0.81 POWDER, FOR SUSPENSION ORAL at 11:05

## 2019-10-21 NOTE — MBS/VFSS/FEES
"Outpatient Speech Language Pathology   Adult Swallow Initial Evaluation   [facility]   Modified Barium Swallow Study (MBS)     Patient Name: Bianka Chan  : 1945  MRN: [id number]  Today's Date: 10/21/2019         Visit Date: 10/21/2019   Patient Active Problem List   Diagnosis   • Pain in both upper extremities   • Meningioma (CMS/HCC)   • Spinal stenosis in cervical region   • Cervical spondylosis with myelopathy   • Complex regional pain syndrome type 1 of left upper extremity   • History of fusion of cervical spine   • Cervical myelopathy (CMS/HCC)   • Physical deconditioning   • Central pain syndrome   • Gait disturbance   • At high risk for falls   • Bilateral occipital neuralgia        Past Medical History:   Diagnosis Date   • Anemia    • Arthritis    • Asthma    • Extremity pain    • History of blood transfusion    • Hypertension    • Osteoarthritis    • Osteoporosis    • Osteoporosis         Past Surgical History:   Procedure Laterality Date   • CERVICAL LAMINECTOMY DECOMPRESSION POSTERIOR  2016    C2-C3, C3-C4,C4-C5 and C5-C6   • HYSTERECTOMY     • TONSILLECTOMY           Visit Dx:     ICD-10-CM ICD-9-CM   1. Dysphagia, unspecified type R13.10 787.20           SLP Adult Swallow Evaluation     Row Name 10/21/19 1100       Rehab Evaluation    Document Type  evaluation  -AC    Subjective Information  no complaints  -AC    Patient Observations  alert;cooperative  -AC    Patient/Family Observations  No family present.  -AC    Patient Effort  good  -AC       General Information    Patient Profile Reviewed  yes  -AC    Pertinent History Of Current Problem  Ms. Chan is a 73yo female referred for modified barium swallow study due to increased difficulty swallowing x1 month. Patient reported that swallowing difficulty is intermittent, but occurs mostly with dry solids, including meats. Patient reported that she \"chokes\" less often if meats are cooked until tender. Patient denied history of " pneumonia. Past medical history significant for cerebellar meningioma, several cervical spine surgergies - most recent was posterior fusion of C2-C3/C3-C4/C4-C5/C5-C6 in 2016 per patient. Patient denied post-operative dysphagia @ that time.  -AC    Current Method of Nutrition  regular textures;thin liquids  -AC    Precautions/Limitations, Vision  WFL;for purposes of eval  -AC    Precautions/Limitations, Hearing  WFL;for purposes of eval  -AC    Barriers to Rehab  none identified  -AC       Pain Assessment    Additional Documentation  Pain Scale: FACES Pre/Post-Treatment (Group)  -AC       Pain Scale: FACES Pre/Post-Treatment    Pain: FACES Scale, Pretreatment  0-->no hurt  -AC    Pain: FACES Scale, Post-Treatment  0-->no hurt  -AC       Oral Motor and Function    Dentition Assessment  natural, present and adequate  -AC       General Eating/Swallowing Observations    Eating/Swallowing Skills  fed by staff/caregiver;self-fed;appropriate self-feeding skills observed  -AC    Positioning During Eating  upright 90 degree;upright in chair  -AC       MBS/VFSS    Utensils Used  spoon;cup;straw  -AC    Consistencies Trialed  thin liquids;pudding thick;regular textures  -AC       MBS/VFSS Interpretation    Oral Prep Phase  WFL  -AC    Oral Transit Phase  WFL  -AC    Oral Residue  WFL  -AC       Initiation of Pharyngeal Swallow    Initiation of Pharyngeal Swallow  bolus in pyriform sinuses  -AC    Pharyngeal Phase  impaired pharyngeal phase of swallowing  -AC    Penetration During the Swallow  thin liquids;secondary to reduced vestibular closure  -AC    Penetration After the Swallow  thin liquids;secondary to residue;in valleculae  -AC    Response to Penetration  shallow;transient  -AC    Rosenbek's Scale  thin:;2--->level 2;pudding/puree:;regular textures:;1--->level 1  -AC    Pharyngeal Residue  all consistencies tested;diffuse within pharynx;secondary to reduced posterior pharyngeal wall stripping  -AC    Response to Residue   cleared residue with spontaneous subsequent swallow  -AC    Pharyngeal Phase, Comment  Oral phase of the swallow was WFL. Mastication was adequate and there was no significant oral residue.     Mild pharyngeal dysphagia characterized by minimal penetration of thin liquid during the swallow secondary to reduced closure at the entrance to the airway. There was also mild diffuse pharyngeal residue across consistencies secondary to reduced posterior pharyngeal wall stripping/pharyngeal contraction. Transient nasal penetration noted intermittently with liquids secondary to reduced velopharyngeal seal.     All appeared to be related to presence of large anterior cervical spine hardware, which impeded velopharyngeal seal, pharyngeal contraction/stripping, epiglottic inversion (and therefore closure at the entrance to the airway). Despite this, patient appeared to protect airway relatively well and no aspiration appreciated with any consistency. Given such, do not expect swallowing exercises to be of benefit to patient at this time.   -AC       Clinical Impression    SLP Swallowing Diagnosis  functional oral phase;mild;pharyngeal dysfunction  -    Swallow Criteria for Skilled Therapeutic Interventions Met  other (see comments) exercises not indicated @ this time  -AC       Recommendations    SLP Diet Recommendation  regular textures;thin liquids;other (see comments) may find softer/moistened solids more comfortable to swallow  -AC    Recommended Precautions and Strategies  upright posture during/after eating;other (see comments) general aspiration precautions  -AC    SLP Rec. for Method of Medication Administration  meds whole;with thin liquids;with pudding or applesauce;as tolerated crush or cut pills PRN  -      User Key  (r) = Recorded By, (t) = Taken By, (c) = Cosigned By    Initials Name Provider Type    Natalee Gaytan MS CCC-SLP Speech and Language Pathologist            OP SLP Education     Row Name 10/21/19  1604       Education    Barriers to Learning  No barriers identified  -    Education Provided  Described results of evaluation;Patient expressed understanding of evaluation;Patient demonstrated recommended strategies  -AC    Assessed  Learning needs;Learning motivation;Learning preferences;Learning readiness  -    Learning Motivation  Strong  -    Learning Method  Explanation  -AC    Teaching Response  Verbalized understanding  -    Education Comments  Offered to provide written instructions with tips and suggestions. Patient declined at this time stating that she is managing well and remembers verbal instructions.  -AC      User Key  (r) = Recorded By, (t) = Taken By, (c) = Cosigned By    Initials Name Effective Dates    Natalee Gaytan MS CCC-SLP 07/27/17 -               OP SLP Assessment/Plan - 10/21/19 1603        SLP Assessment    Functional Problems  Swallowing   -AC    Clinical Impression: Swallowing  Mild:;pharyngeal phase dysphagia   -AC    Prognosis  Good (comment)   -    Patient would benefit from skilled therapy intervention  No   -AC      User Key  (r) = Recorded By, (t) = Taken By, (c) = Cosigned By    Initials Name Provider Type    Natalee Gaytan MS CCC-SLP Speech and Language Pathologist                    Time Calculation:   SLP Start Time: 1100    Therapy Charges for Today     Code Description Service Date Service Provider Modifiers Qty    [id number] HC ST MOTION FLUORO EVAL SWALLOW 4 10/21/2019 Natalee Suárez MS CCC-SLP GN 1                   Natalee Suárez MS CCC-SLP  10/21/2019

## 2019-10-21 NOTE — PROGRESS NOTES
CONSULTATION NOTE    NAME:      Bianka Chan  :                                                          1945  DATE OF CONSULTATION:                       10/21/19  REQUESTING PHYSICIAN:                   Edis Romeo MD  REASON FOR CONSULTATION:                    BRIEF HISTORY:  Bianka Chan  is a very pleasant 74 y.o. female  with an asymptomatic lesion in the left cerebral hemisphere consistent with meningioma. Her most recent MRI of 10/14/19 showed:  Stable appearance of homogeneously enhancing extradural mass in the left posterior fossa with minimal mass effect on the adjacent left cerebellar hemisphere. No hydrocephalus. No new findings or findings of acuity.   She has quadriparesis and extensive spinal fusions in the past.  She has chronic intermittent headaches and feels her balance is getting worse.      Allergies   Allergen Reactions   • Inderal [Propranolol]    • Penicillins    • Phenergan [Promethazine]    • Codeine Rash   • Doxycycline Rash       Social History     Tobacco Use   • Smoking status: Never Smoker   • Smokeless tobacco: Never Used   Substance Use Topics   • Alcohol use: No   • Drug use: No         Past Medical History:   Diagnosis Date   • Anemia    • Arthritis    • Asthma    • Extremity pain    • History of blood transfusion    • Hypertension    • Osteoarthritis    • Osteoporosis    • Osteoporosis        family history includes No Known Problems in her father and mother.     Past Surgical History:   Procedure Laterality Date   • CERVICAL LAMINECTOMY DECOMPRESSION POSTERIOR  2016    C2-C3, C3-C4,C4-C5 and C5-C6   • HYSTERECTOMY     • TONSILLECTOMY          Review of Systems   Constitutional: Positive for fatigue.   Musculoskeletal: Positive for gait problem (uses walker due to balance).   Neurological: Positive for gait problem (uses walker due to balance) and headaches.   Psychiatric/Behavioral: Positive for depression (pt speaks at length about her  "depression.).   All other systems reviewed and are negative.          Objective   VITAL SIGNS:   Vitals:    10/21/19 0859   BP: 162/70   Pulse: 70   Resp: 16   Temp: 98.2 °F (36.8 °C)   TempSrc: Temporal   SpO2: 96%  Comment: RA   Weight: 52.9 kg (116 lb 9.6 oz)   Height: 157.5 cm (62\")   PainSc:   8   PainLoc: Arm  Comment: left arm        KPS       80%    Physical Exam   Constitutional: She is oriented to person, place, and time. She appears well-developed and well-nourished.   Cardiovascular: Normal rate, regular rhythm and normal heart sounds.   Pulmonary/Chest: Effort normal and breath sounds normal.   Neurological: She is alert and oriented to person, place, and time. No cranial nerve deficit.   Nursing note and vitals reviewed.           The following portions of the patient's history were reviewed and updated as appropriate: allergies, current medications, past family history, past medical history, past social history, past surgical history and problem list.    Assessment      IMPRESSION:   Bianka Chan  is a very pleasant 74 y.o. female  with a lesion in the left cerebral hemisphere consistent with meningioma. Her most recent MRI of 10/14/19 showed:  Stable appearance of homogeneously enhancing extradural mass in the left posterior fossa with minimal mass effect on the adjacent left cerebellar hemisphere. No hydrocephalus. No new findings or findings of acuity.   She has quadriparesis and extensive spinal fusions in the past.  She has chronic intermittent headaches and feels her balance is getting worse.      RECOMMENDATIONS: I spent time today with . Mrs. Chan discussing CyberKnife stereotactic radiosurgery to the left cerebellar meningioma.  The pros and cons, risks and benefits of treatment were discussed and informed consent obtained.  Ms. Chan wants to proceed with therapy.  I explained to her that meningiomas grow very slowly and regress very slowly.  I do not expect this to improve any " of her symptoms.  I anticipate 1-3 fractions depending on tolerance dose to adjacent structures.  She will return for simulation and a high-resolution MRI of the brain for treatment planning.  Once those are completed, we should be able to begin treatment within 1-2 weeks pending insurance authorization.  Thank you very much for asking me to see Ms. Vines.  .          Jagruti Woodson MD      Errors in dictation may reflect use of voice recognition software and not all errors in transcription may have been detected prior to signing.

## 2019-10-22 ENCOUNTER — HOSPITAL ENCOUNTER (OUTPATIENT)
Dept: RADIATION ONCOLOGY | Facility: HOSPITAL | Age: 74
Discharge: HOME | End: 2019-10-22

## 2019-10-22 LAB — CREAT BLDA-MCNC: 1.1 MG/DL (ref 0.6–1.3)

## 2019-10-22 PROCEDURE — 77334 RADIATION TREATMENT AID(S): CPT | Performed by: RADIOLOGY

## 2019-10-22 PROCEDURE — 77290 THER RAD SIMULAJ FIELD CPLX: CPT | Performed by: RADIOLOGY

## 2019-10-28 PROCEDURE — 77300 RADIATION THERAPY DOSE PLAN: CPT | Performed by: RADIOLOGY

## 2019-10-28 PROCEDURE — 77295 3-D RADIOTHERAPY PLAN: CPT | Performed by: RADIOLOGY

## 2019-10-28 PROCEDURE — 77334 RADIATION TREATMENT AID(S): CPT | Performed by: RADIOLOGY

## 2019-10-30 ENCOUNTER — TELEPHONE (OUTPATIENT)
Dept: NEUROSURGERY | Facility: CLINIC | Age: 74
End: 2019-10-30

## 2019-10-30 DIAGNOSIS — D32.9 MENINGIOMA (HCC): Primary | ICD-10-CM

## 2019-10-30 DIAGNOSIS — D49.6 BRAIN TUMOR (HCC): ICD-10-CM

## 2019-11-01 ENCOUNTER — HOSPITAL ENCOUNTER (OUTPATIENT)
Dept: RADIATION ONCOLOGY | Facility: HOSPITAL | Age: 74
Setting detail: RADIATION/ONCOLOGY SERIES
Discharge: HOME | End: 2019-11-01

## 2019-11-01 ENCOUNTER — HOSPITAL ENCOUNTER (OUTPATIENT)
Dept: RADIATION ONCOLOGY | Facility: HOSPITAL | Age: 74
Discharge: HOME | End: 2019-11-01

## 2019-11-01 PROCEDURE — 61796 SRS CRANIAL LESION SIMPLE: CPT | Performed by: NEUROLOGICAL SURGERY

## 2019-11-01 PROCEDURE — 77372 SRS LINEAR BASED: CPT | Performed by: RADIOLOGY

## 2019-11-01 PROCEDURE — 77373 STRTCTC BDY RAD THER TX DLVR: CPT | Performed by: RADIOLOGY

## 2019-11-01 PROCEDURE — 77280 THER RAD SIMULAJ FIELD SMPL: CPT | Performed by: RADIOLOGY

## 2019-11-01 PROCEDURE — 77336 RADIATION PHYSICS CONSULT: CPT | Performed by: RADIOLOGY

## 2019-11-01 NOTE — OP NOTE
Preoperative diagnosis: Left cerebellar, posterior fossa meningioma    Postoperative diagnosis: Same    Procedure performed: Stereotactic radiosurgery using CyberKnife protocol to treat posterior fossa meningioma, left cerebellar.    Surgeon: Edis Romeo M.D.    Indications for treatment: This is a 74-year-old female we have been following with sequential MRIs in reference to following a meningioma in the right cerebellar compartment which has shown to show slight enlargement over the past 6 months.  Because of the enlargement, although she remains asymptomatic, elected to proceed with SRS.    The risks and benefits of this procedure have been discussed with the patient.  Consent forms were given and signatures obtained from the patient.  All questions have been answered satisfactorily and the patient wished to proceed with the procedure.    Description of procedure:     The patient was admitted to the CyberKnife suite, placed on the treatment table and an aquaplast mask was fitted to the calvarium to be used during treatment.    MRI and CT scanning data set were performed and entered into the CyberKnife planning and contouring station.  The tumor and surrounding critical structures including brainstem; visual apparatus; and lens of the eye were identified and coutoured to be excluded from the radiation plan.  The area of interest in the left cerebellar hemisphere adjacent to the dura and lateral to the sigmoid sinus was identified and outlined.  A treatment plan was formulated in conjunction with the radiation oncologist and radiation therapist.  The patient received a total of 1400 Gy in a single fraction.    On the day of treatment, the patient returned to the CyberKnife suite and was placed on the treatment table.  The Aquaplast mask was reapplied and the coordinates for treatment using CT and skull x-rays were confirmed.  The treatment time was 35 minutes.  The patient tolerated the procedure very well  incurring no neurological dysfunction or complications.  The patient was discharged from the CyberKnife Suite to be followed by neurosurgery and radiation therapy.    Complications: None

## 2019-11-08 ENCOUNTER — TELEPHONE (OUTPATIENT)
Dept: SOCIAL WORK | Facility: HOSPITAL | Age: 74
End: 2019-11-08

## 2019-11-08 NOTE — TELEPHONE ENCOUNTER
SW called pt to address her distress screening score of 10.  SW left a message requesting a return phone call from pt.  SW available for ongoing support and resource needs.

## 2019-11-08 NOTE — TELEPHONE ENCOUNTER
SW received a return phone call from pt regarding her distress screening.  Pt states that she is doing well and that she is thankful that her tumor is benign.  Pt states that she will only require one radiation treatment.  Pt is concerned primarily about her financial bills associated with medical expenses.  SW mailed a [facility] financial assistance application to pt.  SW provided support and encouraged pt to call with any future needs or concerns.

## 2019-12-04 ENCOUNTER — HOSPITAL ENCOUNTER (OUTPATIENT)
Dept: RADIATION ONCOLOGY | Facility: HOSPITAL | Age: 74
Setting detail: RADIATION/ONCOLOGY SERIES
Discharge: HOME | End: 2019-12-04

## 2019-12-04 ENCOUNTER — OFFICE VISIT (OUTPATIENT)
Dept: RADIATION ONCOLOGY | Facility: HOSPITAL | Age: 74
End: 2019-12-04

## 2019-12-04 DIAGNOSIS — D32.9 MENINGIOMA (HCC): ICD-10-CM

## 2019-12-04 PROCEDURE — G0463 HOSPITAL OUTPT CLINIC VISIT: HCPCS | Performed by: RADIOLOGY

## 2019-12-04 RX ORDER — DULOXETIN HYDROCHLORIDE 30 MG/1
CAPSULE, DELAYED RELEASE ORAL
COMMUNITY
Start: 2019-11-15

## 2019-12-04 NOTE — PROGRESS NOTES
"FOLLOW UP NOTE    PATIENT:                                                      Bianka Chan  MEDICAL RECORD #:                        [id number]  :                                                          1945  COMPLETION DATE:   2019  DIAGNOSIS:     meningioma    BRIEF HISTORY:     Bianka Chan  is a very pleasant 74 y.o. female  with an asymptomatic meningioma in the left cerebral hemisphere.  She has quadriparesis and extensive spinal fusions in the past.  She underwent 14 Gray x1 to a meningioma on 2019.  This is her first follow-up visit. She is working with physical therapy on her balance.  She has no new complaints today.        MEDICATIONS: Medication reconciliation for the patient was reviewed and confirmed in the electronic medical record.    Review of Systems   Constitutional: Positive for fatigue.   Musculoskeletal: Positive for gait problem (pt uses walker).   Neurological: Positive for gait problem (pt uses walker).   All other systems reviewed and are negative.      KPS 80%    Physical Exam   Constitutional: She is oriented to person, place, and time. She appears well-developed and well-nourished. No distress.   Eyes: EOM are normal. No scleral icterus.   Cardiovascular: Normal rate and regular rhythm.   Pulmonary/Chest: Effort normal and breath sounds normal.   Lymphadenopathy:     She has no cervical adenopathy.   Neurological: She is alert and oriented to person, place, and time. No cranial nerve deficit.   Nursing note and vitals reviewed.      VITAL SIGNS:   Vitals:    19 1516 19 0900   BP: 180/75    Pulse: 56    Resp: 16    Temp: 98.9 °F (37.2 °C)    TempSrc: Temporal    SpO2: 95%  Comment: RA    Weight: 52.1 kg (114 lb 12.8 oz) 68.9 kg (152 lb)   Height: 157.5 cm (62\")    PainSc:   2    PainLoc: Arm  Comment: left arm        The following portions of the patient's history were reviewed and updated as appropriate: allergies, current medications, past " family history, past medical history, past social history, past surgical history and problem list.         Bianka was seen today for brain tumor.    Diagnoses and all orders for this visit:    Meningioma (CMS/HCC)         IMPRESSION:  Bianka Chan   She underwent 14 Gray x1 to a meningioma on 11/1/2019.   She is working with physical therapy on her balance.  She has no new complaints today and no acute side effects from treatment..  Her blood pressure was elevated today but she just restarted her blood pressure medication.        RECOMMENDATIONS: Dr. Romeo plans to follow her with serial MRIs.  We will see her back in our department as needed.  It is been a pleasure to participate in her care.    Return for PRN.    Jagruti Woodson MD    Errors in dictation may reflect use of voice recognition software and not all errors in transcription may have been detected prior to signing.

## 2019-12-05 VITALS
DIASTOLIC BLOOD PRESSURE: 75 MMHG | HEIGHT: 62 IN | BODY MASS INDEX: 27.97 KG/M2 | TEMPERATURE: 98.9 F | WEIGHT: 152 LBS | RESPIRATION RATE: 16 BRPM | OXYGEN SATURATION: 95 % | HEART RATE: 56 BPM | SYSTOLIC BLOOD PRESSURE: 180 MMHG

## 2019-12-12 ENCOUNTER — APPOINTMENT (OUTPATIENT)
Dept: MRI IMAGING | Facility: HOSPITAL | Age: 74
End: 2019-12-12

## 2019-12-30 ENCOUNTER — OFFICE VISIT (OUTPATIENT)
Dept: NEUROSURGERY | Facility: CLINIC | Age: 74
End: 2019-12-30

## 2019-12-30 ENCOUNTER — HOSPITAL ENCOUNTER (OUTPATIENT)
Dept: MRI IMAGING | Facility: HOSPITAL | Age: 74
Discharge: HOME | End: 2019-12-30
Admitting: NEUROLOGICAL SURGERY

## 2019-12-30 VITALS
SYSTOLIC BLOOD PRESSURE: 160 MMHG | DIASTOLIC BLOOD PRESSURE: 76 MMHG | BODY MASS INDEX: 21.71 KG/M2 | HEIGHT: 62 IN | WEIGHT: 118 LBS | TEMPERATURE: 97.7 F

## 2019-12-30 DIAGNOSIS — R26.9 GAIT DIFFICULTY: ICD-10-CM

## 2019-12-30 DIAGNOSIS — D32.9 MENINGIOMA (HCC): ICD-10-CM

## 2019-12-30 DIAGNOSIS — D32.9 MENINGIOMA (HCC): Primary | ICD-10-CM

## 2019-12-30 PROCEDURE — A9577 INJ MULTIHANCE: HCPCS | Performed by: NEUROLOGICAL SURGERY

## 2019-12-30 PROCEDURE — 82565 ASSAY OF CREATININE: CPT

## 2019-12-30 PROCEDURE — 70553 MRI BRAIN STEM W/O & W/DYE: CPT

## 2019-12-30 PROCEDURE — 99024 POSTOP FOLLOW-UP VISIT: CPT | Performed by: NEUROLOGICAL SURGERY

## 2019-12-30 PROCEDURE — 0 GADOBENATE DIMEGLUMINE 529 MG/ML SOLUTION: Performed by: NEUROLOGICAL SURGERY

## 2019-12-30 RX ORDER — MULTIVIT-MIN/IRON/FOLIC ACID/K 18-600-40
50 CAPSULE ORAL DAILY
COMMUNITY

## 2019-12-30 RX ADMIN — GADOBENATE DIMEGLUMINE 10 ML: 529 INJECTION, SOLUTION INTRAVENOUS at 12:05

## 2019-12-30 NOTE — PROGRESS NOTES
Bianka Chan  1945  [id number]                        CHIEF COMPLAINT: Follow-up SRS         MEDICAL HISTORY SINCE LAST ENCOUNTER: 74-year-old female treated with SRS for a left cerebellar hemisphere meningioma.  Since her initial post procedure study.  Symptoms have remained relatively stable and fixed related to her previous surgery several years ago craniocervical fixation.           Past Medical History:   Diagnosis Date   • Anemia    • Arthritis    • Asthma    • Extremity pain    • History of blood transfusion    • Hypertension    • Osteoarthritis    • Osteoporosis    • Osteoporosis               Past Surgical History:   Procedure Laterality Date   • CERVICAL LAMINECTOMY DECOMPRESSION POSTERIOR  04/27/2016    C2-C3, C3-C4,C4-C5 and C5-C6   • HYSTERECTOMY     • TONSILLECTOMY                Family History   Problem Relation Age of Onset   • No Known Problems Mother    • No Known Problems Father               Social History     Socioeconomic History   • Marital status:      Spouse name: Not on file   • Number of children: Not on file   • Years of education: Not on file   • Highest education level: Not on file   Tobacco Use   • Smoking status: Never Smoker   • Smokeless tobacco: Never Used   Substance and Sexual Activity   • Alcohol use: No   • Drug use: No   • Sexual activity: Defer              Allergies   Allergen Reactions   • Inderal [Propranolol]    • Penicillins    • Phenergan [Promethazine]    • Codeine Rash   • Doxycycline Rash              Current Outpatient Medications:   •  albuterol (PROVENTIL HFA;VENTOLIN HFA) 108 (90 BASE) MCG/ACT inhaler, Inhale 2 puffs as needed for wheezing., Disp: , Rfl:   •  atorvastatin (LIPITOR) 20 MG tablet, , Disp: , Rfl:   •  calcium carbonate (OS-SUSAN) 600 MG tablet, Take 600 mg by mouth daily., Disp: , Rfl:   •  Cyanocobalamin 1000 MCG/ML kit, Inject  as directed., Disp: , Rfl:   •  DICLOFENAC SODIUM PO, Take 75 mg by mouth daily., Disp: , Rfl:   •   "DULoxetine (CYMBALTA) 30 MG capsule, , Disp: , Rfl:   •  enalapril (VASOTEC) 5 MG tablet, Take 5 mg by mouth daily., Disp: , Rfl:   •  fluticasone (FLONASE) 50 MCG/ACT nasal spray, 2 sprays into each nostril as needed for rhinitis. Administer 2 sprays in each nostril for each dose., Disp: , Rfl:   •  oxybutynin XL (DITROPAN-XL) 10 MG 24 hr tablet, , Disp: , Rfl:   •  Prenatal Vit-Fe Fumarate-FA (PRENATAL, CLASSIC, VITAMIN) 28-0.8 MG tablet tablet, Take 1 tablet by mouth daily., Disp: , Rfl:   •  Vitamin D, Cholecalciferol, 50 MCG (2000 UT) capsule, Take 50 mcg by mouth Daily., Disp: , Rfl:   •  Pamidronate Disodium (AREDIA IV), Infuse  into a venous catheter., Disp: , Rfl:   No current facility-administered medications for this visit.          Review of Systems   Constitutional: Positive for fatigue.   Genitourinary: Positive for frequency.   Musculoskeletal: Positive for gait problem.   Psychiatric/Behavioral: Positive for dysphoric mood.               Vitals:    12/30/19 1341   BP: 160/76   BP Location: Right arm   Patient Position: Sitting   Cuff Size: Adult   Temp: 97.7 °F (36.5 °C)   Weight: 53.5 kg (118 lb)   Height: 157.5 cm (62\")               EXAMINATION: Ataxic hyperreflexic in the limited range of motion of her cervical spine is unchanged            MEDICAL DECISION MAKING: MRI shows a 1 mm difference in the size of the meningioma           ASSESSMENT/DISPOSITION: She will return to see us in 6 months for sequential follow-up.  We will continue to follow this every 6 months.  I will keep you informed and thanks for allow me to see her.              I APPRECIATE THE OPPORTUNITY OF THIS REFERRAL. PLEASE CALL IF ANY       QUESTIONS [phone]    "

## 2020-01-08 LAB — CREAT BLDA-MCNC: 1.1 MG/DL (ref 0.6–1.3)

## 2020-06-29 ENCOUNTER — HOSPITAL ENCOUNTER (OUTPATIENT)
Dept: MRI IMAGING | Facility: HOSPITAL | Age: 75
Discharge: HOME | End: 2020-06-29
Admitting: NEUROLOGICAL SURGERY

## 2020-06-29 ENCOUNTER — OFFICE VISIT (OUTPATIENT)
Dept: NEUROSURGERY | Facility: CLINIC | Age: 75
End: 2020-06-29

## 2020-06-29 VITALS
DIASTOLIC BLOOD PRESSURE: 68 MMHG | WEIGHT: 107 LBS | HEIGHT: 62 IN | SYSTOLIC BLOOD PRESSURE: 130 MMHG | BODY MASS INDEX: 19.69 KG/M2 | TEMPERATURE: 96.8 F

## 2020-06-29 DIAGNOSIS — D32.9 MENINGIOMA (HCC): ICD-10-CM

## 2020-06-29 DIAGNOSIS — D32.9 MENINGIOMA (HCC): Primary | ICD-10-CM

## 2020-06-29 DIAGNOSIS — R26.9 GAIT DIFFICULTY: ICD-10-CM

## 2020-06-29 PROCEDURE — 99213 OFFICE O/P EST LOW 20 MIN: CPT | Performed by: NEUROLOGICAL SURGERY

## 2020-06-29 PROCEDURE — 70553 MRI BRAIN STEM W/O & W/DYE: CPT

## 2020-06-29 PROCEDURE — 82565 ASSAY OF CREATININE: CPT

## 2020-06-29 PROCEDURE — A9577 INJ MULTIHANCE: HCPCS | Performed by: NEUROLOGICAL SURGERY

## 2020-06-29 PROCEDURE — 0 GADOBENATE DIMEGLUMINE 529 MG/ML SOLUTION: Performed by: NEUROLOGICAL SURGERY

## 2020-06-29 RX ADMIN — GADOBENATE DIMEGLUMINE 10 ML: 529 INJECTION, SOLUTION INTRAVENOUS at 11:45

## 2020-06-29 NOTE — PROGRESS NOTES
Bianka Chan  1945  [id number]                        CHIEF COMPLAINT: Follow-up SRS         MEDICAL HISTORY SINCE LAST ENCOUNTER:  [This 75-year-old female is now 6 months subsequent SRS utilized to treat a left cerebellar hemisphere meningioma which is an incidental finding and remains asymptomatic.  MRI was repeated and she reports today for follow-up.           Past Medical History:   Diagnosis Date   • Anemia    • Arthritis    • Asthma    • Extremity pain    • History of blood transfusion    • Hypertension    • Osteoarthritis    • Osteoporosis    • Osteoporosis               Past Surgical History:   Procedure Laterality Date   • CERVICAL LAMINECTOMY DECOMPRESSION POSTERIOR  04/27/2016    C2-C3, C3-C4,C4-C5 and C5-C6   • HYSTERECTOMY     • TONSILLECTOMY                Family History   Problem Relation Age of Onset   • No Known Problems Mother    • No Known Problems Father               Social History     Socioeconomic History   • Marital status:      Spouse name: Not on file   • Number of children: Not on file   • Years of education: Not on file   • Highest education level: Not on file   Tobacco Use   • Smoking status: Never Smoker   • Smokeless tobacco: Never Used   Substance and Sexual Activity   • Alcohol use: No   • Drug use: No   • Sexual activity: Defer              Allergies   Allergen Reactions   • Inderal [Propranolol]    • Penicillins    • Phenergan [Promethazine]    • Codeine Rash   • Doxycycline Rash              Current Outpatient Medications:   •  albuterol (PROVENTIL HFA;VENTOLIN HFA) 108 (90 BASE) MCG/ACT inhaler, Inhale 2 puffs as needed for wheezing., Disp: , Rfl:   •  atorvastatin (LIPITOR) 20 MG tablet, , Disp: , Rfl:   •  calcium carbonate (OS-SUSAN) 600 MG tablet, Take 600 mg by mouth daily., Disp: , Rfl:   •  Cyanocobalamin 1000 MCG/ML kit, Inject  as directed., Disp: , Rfl:   •  DULoxetine (CYMBALTA) 30 MG capsule, , Disp: , Rfl:   •  enalapril (VASOTEC) 5 MG tablet, Take 5  mg by mouth daily., Disp: , Rfl:   •  fluticasone (FLONASE) 50 MCG/ACT nasal spray, 2 sprays into each nostril as needed for rhinitis. Administer 2 sprays in each nostril for each dose., Disp: , Rfl:   •  oxybutynin XL (DITROPAN-XL) 10 MG 24 hr tablet, , Disp: , Rfl:   •  Prenatal Vit-Fe Fumarate-FA (PRENATAL, CLASSIC, VITAMIN) 28-0.8 MG tablet tablet, Take 1 tablet by mouth daily., Disp: , Rfl:   •  Vitamin D, Cholecalciferol, 50 MCG (2000 UT) capsule, Take 50 mcg by mouth Daily., Disp: , Rfl:   No current facility-administered medications for this visit.          Review of Systems   Constitutional: Positive for appetite change. Negative for activity change, chills, diaphoresis, fatigue, fever and unexpected weight change.   HENT: Positive for mouth sores. Negative for congestion, dental problem, drooling, ear discharge, ear pain, facial swelling, hearing loss, nosebleeds, postnasal drip, rhinorrhea, sinus pressure, sneezing, sore throat, tinnitus, trouble swallowing and voice change.    Eyes: Negative for photophobia, pain, discharge, redness, itching and visual disturbance.   Respiratory: Negative for apnea, cough, choking, chest tightness, shortness of breath, wheezing and stridor.    Cardiovascular: Negative for chest pain, palpitations and leg swelling.   Gastrointestinal: Negative for abdominal distention, abdominal pain, anal bleeding, blood in stool, constipation, diarrhea, nausea, rectal pain and vomiting.   Endocrine: Negative for cold intolerance, heat intolerance, polydipsia, polyphagia and polyuria.   Genitourinary: Positive for frequency. Negative for decreased urine volume, difficulty urinating, dysuria, enuresis, flank pain, genital sores, hematuria and urgency.   Musculoskeletal: Positive for gait problem. Negative for arthralgias, back pain, joint swelling, myalgias, neck pain and neck stiffness.   Skin: Negative for color change, pallor, rash and wound.   Allergic/Immunologic: Negative for  "environmental allergies, food allergies and immunocompromised state.   Neurological: Negative for dizziness, tremors, seizures, syncope, facial asymmetry, speech difficulty, weakness, light-headedness, numbness and headaches.   Hematological: Negative for adenopathy. Does not bruise/bleed easily.   Psychiatric/Behavioral: Positive for dysphoric mood. Negative for agitation, behavioral problems, confusion, decreased concentration, hallucinations, self-injury, sleep disturbance and suicidal ideas. The patient is not nervous/anxious and is not hyperactive.                Vitals:    06/29/20 1300   BP: 130/68   BP Location: Right arm   Temp: 96.8 °F (36 °C)   TempSrc: Temporal   Weight: 48.5 kg (107 lb)   Height: 157.5 cm (62\")               EXAMINATION: Her neurologic examination is relatively fixed and unchanged from her previous examination which showed the presence of a rather significant myelopathy.  She is ataxic with hyperreflexic.  She has a marketed limited range of motion of her cervical spine which is stable.            MEDICAL DECISION MAKING: MRI of the brain is compared to the one done prior to SRS.  It is essentially stable with a change of approximately 1-2 mm smaller or larger depending on point of measurement.  Overall stable           ASSESSMENT/DISPOSITION: She return to see us in 6 months for continued surveillance.  I will keep you informed and appreciate seeing her.  She has no edema or swelling around the tumor.  Therefore there is no urgency for anything other than follow-up.              I APPRECIATE THE OPPORTUNITY OF THIS REFERRAL. PLEASE CALL IF ANY       QUESTIONS [phone]        I have received verbal consent from the patient to receive care via telehealth.   "

## 2020-07-18 LAB — CREAT BLDA-MCNC: 0.9 MG/DL (ref 0.6–1.3)

## 2020-12-14 ENCOUNTER — HOSPITAL ENCOUNTER (OUTPATIENT)
Dept: MRI IMAGING | Facility: HOSPITAL | Age: 75
Discharge: HOME | End: 2020-12-14
Admitting: NEUROLOGICAL SURGERY

## 2020-12-14 ENCOUNTER — OFFICE VISIT (OUTPATIENT)
Dept: NEUROSURGERY | Facility: CLINIC | Age: 75
End: 2020-12-14

## 2020-12-14 VITALS
WEIGHT: 106 LBS | TEMPERATURE: 97.4 F | DIASTOLIC BLOOD PRESSURE: 60 MMHG | SYSTOLIC BLOOD PRESSURE: 118 MMHG | BODY MASS INDEX: 19.51 KG/M2 | HEIGHT: 62 IN

## 2020-12-14 DIAGNOSIS — D32.9 MENINGIOMA (HCC): Primary | ICD-10-CM

## 2020-12-14 PROCEDURE — A9577 INJ MULTIHANCE: HCPCS | Performed by: NEUROLOGICAL SURGERY

## 2020-12-14 PROCEDURE — 99213 OFFICE O/P EST LOW 20 MIN: CPT | Performed by: NEUROLOGICAL SURGERY

## 2020-12-14 PROCEDURE — 70553 MRI BRAIN STEM W/O & W/DYE: CPT

## 2020-12-14 PROCEDURE — 0 GADOBENATE DIMEGLUMINE 529 MG/ML SOLUTION: Performed by: NEUROLOGICAL SURGERY

## 2020-12-14 RX ADMIN — GADOBENATE DIMEGLUMINE 9 ML: 529 INJECTION, SOLUTION INTRAVENOUS at 13:25

## 2020-12-14 NOTE — PROGRESS NOTES
Bianka Chan  1945  [id number]                        CHIEF COMPLAINT: Follow-up SRS         MEDICAL HISTORY SINCE LAST ENCOUNTER: This 75-year-old female who is 12 months subsequent to SRS utilized to treat the left cerebellar hemisphere meningioma which was an incidental finding and remains asymptomatic.  Her major neurological issues have been that related to a basilar impression requiring decompression and stabilization 30 years ago followed by cervical laminectomies and anterior cervical fusions for degenerative osteoarthritis.  Her issues have been primarily that of a cervical myelopathy which she has endured, stable over the past several years.  And a course of follow-up she had a x-ray performed showing the presence of a left cerebellar hemisphere meningioma.  Therapeutic options were reviewed at that time and she elected to proceed with SRS.  She remains asymptomatic.  Reports today for follow-up.    She has had issues with weight loss secondary to H. pylori and has been followed by GI medicine           Past Medical History:   Diagnosis Date   • Anemia    • Arthritis    • Asthma    • Extremity pain    • History of blood transfusion    • Hypertension    • Osteoarthritis    • Osteoporosis    • Osteoporosis               Past Surgical History:   Procedure Laterality Date   • CERVICAL LAMINECTOMY DECOMPRESSION POSTERIOR  04/27/2016    C2-C3, C3-C4,C4-C5 and C5-C6   • HYSTERECTOMY     • TONSILLECTOMY                Family History   Problem Relation Age of Onset   • No Known Problems Mother    • No Known Problems Father               Social History     Socioeconomic History   • Marital status:      Spouse name: Not on file   • Number of children: Not on file   • Years of education: Not on file   • Highest education level: Not on file   Tobacco Use   • Smoking status: Never Smoker   • Smokeless tobacco: Never Used   Substance and Sexual Activity   • Alcohol use: No   • Drug use: No   • Sexual  activity: Defer              Allergies   Allergen Reactions   • Inderal [Propranolol]    • Penicillins    • Phenergan [Promethazine]    • Codeine Rash   • Doxycycline Rash              Current Outpatient Medications:   •  albuterol (PROVENTIL HFA;VENTOLIN HFA) 108 (90 BASE) MCG/ACT inhaler, Inhale 2 puffs as needed for wheezing., Disp: , Rfl:   •  atorvastatin (LIPITOR) 20 MG tablet, , Disp: , Rfl:   •  calcium carbonate (OS-SUSAN) 600 MG tablet, Take 600 mg by mouth daily., Disp: , Rfl:   •  Cyanocobalamin 1000 MCG/ML kit, Inject  as directed., Disp: , Rfl:   •  DULoxetine (CYMBALTA) 30 MG capsule, , Disp: , Rfl:   •  enalapril (VASOTEC) 5 MG tablet, Take 5 mg by mouth daily., Disp: , Rfl:   •  fluticasone (FLONASE) 50 MCG/ACT nasal spray, 2 sprays into each nostril as needed for rhinitis. Administer 2 sprays in each nostril for each dose., Disp: , Rfl:   •  oxybutynin XL (DITROPAN-XL) 10 MG 24 hr tablet, , Disp: , Rfl:   •  Prenatal Vit-Fe Fumarate-FA (PRENATAL, CLASSIC, VITAMIN) 28-0.8 MG tablet tablet, Take 1 tablet by mouth daily., Disp: , Rfl:   •  Vitamin D, Cholecalciferol, 50 MCG (2000 UT) capsule, Take 50 mcg by mouth Daily., Disp: , Rfl:   No current facility-administered medications for this visit.          Review of Systems   Constitutional: Positive for appetite change. Negative for activity change, chills, diaphoresis, fatigue, fever and unexpected weight change.   HENT: Positive for mouth sores. Negative for congestion, dental problem, drooling, ear discharge, ear pain, facial swelling, hearing loss, nosebleeds, postnasal drip, rhinorrhea, sinus pressure, sneezing, sore throat, tinnitus, trouble swallowing and voice change.    Eyes: Negative for photophobia, pain, discharge, redness, itching and visual disturbance.   Respiratory: Negative for apnea, cough, choking, chest tightness, shortness of breath, wheezing and stridor.    Cardiovascular: Negative for chest pain, palpitations and leg swelling.  "  Gastrointestinal: Negative for abdominal distention, abdominal pain, anal bleeding, blood in stool, constipation, diarrhea, nausea, rectal pain and vomiting.   Endocrine: Negative for cold intolerance, heat intolerance, polydipsia, polyphagia and polyuria.   Genitourinary: Positive for frequency. Negative for decreased urine volume, difficulty urinating, dysuria, enuresis, flank pain, genital sores, hematuria and urgency.   Musculoskeletal: Positive for gait problem. Negative for arthralgias, back pain, joint swelling, myalgias, neck pain and neck stiffness.   Skin: Negative for color change, pallor, rash and wound.   Allergic/Immunologic: Negative for environmental allergies, food allergies and immunocompromised state.   Neurological: Negative for dizziness, tremors, seizures, syncope, facial asymmetry, speech difficulty, weakness, light-headedness, numbness and headaches.   Hematological: Negative for adenopathy. Does not bruise/bleed easily.   Psychiatric/Behavioral: Positive for dysphoric mood. Negative for agitation, behavioral problems, confusion, decreased concentration, hallucinations, self-injury, sleep disturbance and suicidal ideas. The patient is not nervous/anxious and is not hyperactive.                Vitals:    12/14/20 1515   BP: 118/60   BP Location: Right arm   Patient Position: Sitting   Cuff Size: Adult   Temp: 97.4 °F (36.3 °C)   TempSrc: Infrared   Weight: 48.1 kg (106 lb)   Height: 157.5 cm (62\")               EXAMINATION: Unchanged.  Cervical myelopathy underlies weakness in both lower extremities however this is unchanged and quite stable            MEDICAL DECISION MAKING: MRI brain shows no significant change in the lesion.  This is perhaps slightly smaller from her previous measurements.  No changes within the cerebellar brain tissue           ASSESSMENT/DISPOSITION: She return in 6 months for continued surveillance.  Surgery is a consideration should this show enlargement and/or she " becomes symptomatic.              I APPRECIATE THE OPPORTUNITY OF THIS REFERRAL. PLEASE CALL IF ANY       QUESTIONS [phone]

## 2021-06-08 ENCOUNTER — HOSPITAL ENCOUNTER (OUTPATIENT)
Dept: MRI IMAGING | Facility: HOSPITAL | Age: 76
Discharge: HOME | End: 2021-06-08
Admitting: PHYSICIAN ASSISTANT

## 2021-06-08 ENCOUNTER — OFFICE VISIT (OUTPATIENT)
Dept: NEUROSURGERY | Facility: CLINIC | Age: 76
End: 2021-06-08

## 2021-06-08 VITALS
SYSTOLIC BLOOD PRESSURE: 132 MMHG | HEART RATE: 72 BPM | HEIGHT: 62 IN | RESPIRATION RATE: 18 BRPM | DIASTOLIC BLOOD PRESSURE: 78 MMHG | OXYGEN SATURATION: 100 % | TEMPERATURE: 96.8 F | WEIGHT: 92 LBS | BODY MASS INDEX: 16.93 KG/M2

## 2021-06-08 DIAGNOSIS — D32.9 MENINGIOMA (HCC): Primary | ICD-10-CM

## 2021-06-08 DIAGNOSIS — M47.12 CERVICAL SPONDYLOSIS WITH MYELOPATHY: ICD-10-CM

## 2021-06-08 DIAGNOSIS — R26.9 GAIT DIFFICULTY: ICD-10-CM

## 2021-06-08 PROCEDURE — 0 GADOBENATE DIMEGLUMINE 529 MG/ML SOLUTION: Performed by: PHYSICIAN ASSISTANT

## 2021-06-08 PROCEDURE — 70553 MRI BRAIN STEM W/O & W/DYE: CPT

## 2021-06-08 PROCEDURE — 82565 ASSAY OF CREATININE: CPT

## 2021-06-08 PROCEDURE — 99214 OFFICE O/P EST MOD 30 MIN: CPT | Performed by: PHYSICIAN ASSISTANT

## 2021-06-08 PROCEDURE — A9577 INJ MULTIHANCE: HCPCS | Performed by: PHYSICIAN ASSISTANT

## 2021-06-08 RX ADMIN — GADOBENATE DIMEGLUMINE 8 ML: 529 INJECTION, SOLUTION INTRAVENOUS at 15:14

## 2021-06-08 NOTE — PROGRESS NOTES
Bianka Chan   1945   [id number]       06/08/2021     Chief Complaint   Patient presents with   • Meningioma (CMS/HCC)    Weak gait  Pain in left arm  Numbness in fingers in left hand  Lost weight, mouth sore    HPI   Is a very pleasant 76-year-old female who is being followed for a left cerebellar meningioma.  This was an incidental finding, she underwent SRS to treat the meningioma due to an increase in size on 11/1/2019.  She is followed with serial MRI scans for monitoring and here with a new brain MRI scan for review.  Patient reports that she is having pain and numbness down into the left arm and numbness into all of the fingers in the left hand.  She has a history of cervical myelopathy and underwent 6 surgeries on her cervical spine, in 1990 she had done, at an outside hospital,a transoral odontontoidectomy and occipital cervical fusion for a basilar impression and myelopathy, Dr. Dey did C2-3, C3-4, C4-5 and C5-6 decompressive laminectomies with posterior lateral fusion from C2-C6 on 4/27/2016.  [facility] 7/25/2018. She reports that for the last several years she has had to utilize a walker due to worsening balance and weakness in her legs.  Since her last visit in December she is gone from 106 pounds to 92 pounds because of soreness in her mouth.  She has been drinking boost.  She tells me she has an appointment with ENT tomorrow for evaluation of her tongue.  She brought up the issue that she may have to have a feeding tube.    Chronic Illnesses:  Severe osteoarthritis  Chronic neck pain  Osteoporosis    Past Medical History:  No date: Anemia  No date: Arthritis  No date: Asthma  No date: Extremity pain  No date: History of blood transfusion  No date: Hypertension  No date: Osteoarthritis  No date: Osteoporosis  No date: Osteoporosis     Past Surgical History:   Procedure Laterality Date   • CERVICAL LAMINECTOMY DECOMPRESSION POSTERIOR  04/27/2016    C2-C3, C3-C4,C4-C5 and C5-C6   • HYSTERECTOMY      • TONSILLECTOMY          Allergies   Allergen Reactions   • Inderal [Propranolol]    • Penicillins    • Phenergan [Promethazine]    • Codeine Rash   • Doxycycline Rash          Current Outpatient Medications:   •  albuterol (PROVENTIL HFA;VENTOLIN HFA) 108 (90 BASE) MCG/ACT inhaler, Inhale 2 puffs as needed for wheezing., Disp: , Rfl:   •  atorvastatin (LIPITOR) 20 MG tablet, , Disp: , Rfl:   •  calcium carbonate (OS-SUSAN) 600 MG tablet, Take 600 mg by mouth daily., Disp: , Rfl:   •  Cyanocobalamin 1000 MCG/ML kit, Inject  as directed., Disp: , Rfl:   •  DULoxetine (CYMBALTA) 30 MG capsule, , Disp: , Rfl:   •  enalapril (VASOTEC) 5 MG tablet, Take 5 mg by mouth daily., Disp: , Rfl:   •  fluticasone (FLONASE) 50 MCG/ACT nasal spray, 2 sprays into each nostril as needed for rhinitis. Administer 2 sprays in each nostril for each dose., Disp: , Rfl:   •  oxybutynin XL (DITROPAN-XL) 10 MG 24 hr tablet, , Disp: , Rfl:   •  Prenatal Vit-Fe Fumarate-FA (PRENATAL, CLASSIC, VITAMIN) 28-0.8 MG tablet tablet, Take 1 tablet by mouth daily., Disp: , Rfl:   •  Vitamin D, Cholecalciferol, 50 MCG (2000 UT) capsule, Take 50 mcg by mouth Daily., Disp: , Rfl:   No current facility-administered medications for this visit.     Social History     Socioeconomic History   • Marital status:      Spouse name: Not on file   • Number of children: Not on file   • Years of education: Not on file   • Highest education level: Not on file   Tobacco Use   • Smoking status: Never Smoker   • Smokeless tobacco: Never Used   Vaping Use   • Vaping Use: Never used   Substance and Sexual Activity   • Alcohol use: No   • Drug use: No   • Sexual activity: Defer        family history includes No Known Problems in her father and mother.     Social History    Tobacco Use      Smoking status: Never Smoker      Smokeless tobacco: Never Used       Body mass index is 16.83 kg/m².   Patient's Body mass index is 16.83 kg/m². indicating that she is underweight  "(BMI < 18.5). Recommendations include: referral to primary care.  She is seeing ENT in the a.m. for soreness in her mouth.       /78   Pulse 72   Temp 96.8 °F (36 °C)   Resp 18   Ht 157.5 cm (62\")   Wt 41.7 kg (92 lb)   SpO2 100%   BMI 16.83 kg/m²    Physical Examination:  HEENT-wnl  Lungs-No wheezing or SOB      Neurologic Exam   The patient's gait is weak and unsteady as she walks with her walker.  Hyperreflexia in the upper and lower extremities  Positive Mayuri's  Negative clonus    Radiological Data Review:  I have independently interpreted the imaging and discussed the findings with patient and discussed appropriate management.  MRI of the brain shows a left cerebellar meningioma which is similar in size compared to the study 6 months ago.  Final measurement is pending.        I have reviewed her prior MRI scan as well as her prior cervical MRI from 2018.    Assessment and Plan:  1.  Left cerebellar hemisphere meningioma, treated with SRS 2019.  2.  Severe weight loss now with a BMI of 16.83 at 92 pounds.  Patient has an appoint with ENT in the a.m.  We will touch base with her tomorrow afternoon.  She reports that she is utilizing boost supplements.  3.  Cervical myelopathy-worsening according to the patient  4.  Left arm pain numbness and tingling into all the fingers.    We will plan on a follow-up MRI of the cervical spine as well as EMG and nerve conduction study of the left upper extremity.  Her condition is chronic in nature and her weakness may be due to her poor nutritional status.  The patient wishes to proceed with these new diagnostic studies and I will see her back in the office when they are completed.  I would continue with MRI of the brain in 6 months.    Sarika Marshall, PAC      PCP:  Juliocesar Alfaro III, MD     "

## 2021-06-13 LAB — CREAT BLDA-MCNC: 0.4 MG/DL (ref 0.6–1.3)

## 2021-06-23 ENCOUNTER — APPOINTMENT (OUTPATIENT)
Dept: MRI IMAGING | Facility: HOSPITAL | Age: 76
End: 2021-06-23

## 2021-06-23 ENCOUNTER — APPOINTMENT (OUTPATIENT)
Dept: NEUROLOGY | Facility: HOSPITAL | Age: 76
End: 2021-06-23

## 2024-12-12 ENCOUNTER — TELEPHONE (OUTPATIENT)
Dept: FAMILY MEDICINE CLINIC | Facility: CLINIC | Age: 79
End: 2024-12-12
Payer: MEDICARE

## 2024-12-12 NOTE — TELEPHONE ENCOUNTER
[facility]  Fax: [phone]  Can call back:  [phone]    Calling to ask for orders for:  CT Abdomen Pelvis With and WithoutContrast